# Patient Record
Sex: FEMALE | Race: WHITE | NOT HISPANIC OR LATINO | Employment: OTHER | URBAN - METROPOLITAN AREA
[De-identification: names, ages, dates, MRNs, and addresses within clinical notes are randomized per-mention and may not be internally consistent; named-entity substitution may affect disease eponyms.]

---

## 2023-08-14 ENCOUNTER — HOSPITAL ENCOUNTER (OUTPATIENT)
Dept: DATA CONVERSION | Facility: HOSPITAL | Age: 78
Discharge: HOME | End: 2023-08-14

## 2023-08-14 DIAGNOSIS — E78.00 PURE HYPERCHOLESTEROLEMIA, UNSPECIFIED: ICD-10-CM

## 2023-08-14 DIAGNOSIS — Z00.00 ENCOUNTER FOR GENERAL ADULT MEDICAL EXAMINATION WITHOUT ABNORMAL FINDINGS: ICD-10-CM

## 2023-08-14 LAB
ALBUMIN SERPL-MCNC: 4.5 GM/DL (ref 3.5–5)
ALBUMIN/GLOB SERPL: 2 RATIO (ref 1.5–3)
ALP BLD-CCNC: 52 U/L (ref 35–125)
ALT SERPL-CCNC: 25 U/L (ref 5–40)
ANION GAP SERPL CALCULATED.3IONS-SCNC: 9 MMOL/L (ref 0–19)
APPEARANCE PLAS: ABNORMAL
AST SERPL-CCNC: 19 U/L (ref 5–40)
BASOPHILS # BLD AUTO: 0.03 K/UL (ref 0–0.22)
BASOPHILS NFR BLD AUTO: 0.5 % (ref 0–1)
BILIRUB SERPL-MCNC: 0.4 MG/DL (ref 0.1–1.2)
BILIRUB UR QL STRIP.AUTO: NEGATIVE
BUN SERPL-MCNC: 28 MG/DL (ref 8–25)
BUN/CREAT SERPL: 25.5 RATIO (ref 8–21)
CALCIUM SERPL-MCNC: 10.5 MG/DL (ref 8.5–10.4)
CHLORIDE SERPL-SCNC: 107 MMOL/L (ref 97–107)
CHOLEST SERPL-MCNC: 209 MG/DL (ref 133–200)
CHOLEST/HDLC SERPL: 3.5 RATIO
CLARITY UR: CLEAR
CO2 SERPL-SCNC: 28 MMOL/L (ref 24–31)
COLOR SPUN FLD: ABNORMAL
COLOR UR: NORMAL
CREAT SERPL-MCNC: 1.1 MG/DL (ref 0.4–1.6)
DEPRECATED RDW RBC AUTO: 46.4 FL (ref 37–54)
DIFFERENTIAL METHOD BLD: ABNORMAL
EOSINOPHIL # BLD AUTO: 0.07 K/UL (ref 0–0.45)
EOSINOPHIL NFR BLD: 1.2 % (ref 0–3)
ERYTHROCYTE [DISTWIDTH] IN BLOOD BY AUTOMATED COUNT: 13.4 % (ref 11.7–15)
FASTING STATUS PATIENT QL REPORTED: ABNORMAL
GFR SERPL CREATININE-BSD FRML MDRD: 51 ML/MIN/1.73 M2
GLOBULIN SER-MCNC: 2.3 G/DL (ref 1.9–3.7)
GLUCOSE SERPL-MCNC: 94 MG/DL (ref 65–99)
GLUCOSE UR STRIP.AUTO-MCNC: NEGATIVE MG/DL
HCT VFR BLD AUTO: 47.8 % (ref 36–44)
HDLC SERPL-MCNC: 60 MG/DL
HGB BLD-MCNC: 15.3 GM/DL (ref 12–15)
HGB UR QL: NEGATIVE
IMM GRANULOCYTES # BLD AUTO: 0.04 K/UL (ref 0–0.1)
KETONES UR QL STRIP.AUTO: NEGATIVE
LDLC SERPL CALC-MCNC: 127 MG/DL (ref 65–130)
LEUKOCYTE ESTERASE UR QL STRIP.AUTO: NEGATIVE
LYMPHOCYTES # BLD AUTO: 1.56 K/UL (ref 1.2–3.2)
LYMPHOCYTES NFR BLD MANUAL: 26.4 % (ref 20–40)
MCH RBC QN AUTO: 30.2 PG (ref 26–34)
MCHC RBC AUTO-ENTMCNC: 32 % (ref 31–37)
MCV RBC AUTO: 94.3 FL (ref 80–100)
MONOCYTES # BLD AUTO: 0.53 K/UL (ref 0–0.8)
MONOCYTES NFR BLD MANUAL: 9 % (ref 0–8)
NEUTROPHILS # BLD AUTO: 3.69 K/UL
NEUTROPHILS # BLD AUTO: 3.69 K/UL (ref 1.8–7.7)
NEUTROPHILS.IMMATURE NFR BLD: 0.7 % (ref 0–1)
NEUTS SEG NFR BLD: 62.2 % (ref 50–70)
NITRITE UR QL STRIP.AUTO: NEGATIVE
NRBC BLD-RTO: 0 /100 WBC
PH UR STRIP.AUTO: 6 [PH] (ref 4.6–8)
PLATELET # BLD AUTO: 265 K/UL (ref 150–450)
PMV BLD AUTO: 9.9 CU (ref 7–12.6)
POTASSIUM SERPL-SCNC: 4.8 MMOL/L (ref 3.4–5.1)
PROT SERPL-MCNC: 6.8 G/DL (ref 5.9–7.9)
PROT UR STRIP.AUTO-MCNC: NEGATIVE MG/DL
RBC # BLD AUTO: 5.07 M/UL (ref 4–4.9)
REFLEX MICROSCOPIC (UA): NORMAL
SODIUM SERPL-SCNC: 144 MMOL/L (ref 133–145)
SP GR UR STRIP.AUTO: 1.02 (ref 1–1.03)
TRIGL SERPL-MCNC: 110 MG/DL (ref 40–150)
UROBILINOGEN UR QL STRIP.AUTO: NORMAL MG/DL (ref 0–1)
WBC # BLD AUTO: 5.9 K/UL (ref 4.5–11)

## 2023-09-23 ENCOUNTER — HOSPITAL ENCOUNTER (OUTPATIENT)
Dept: DATA CONVERSION | Facility: HOSPITAL | Age: 78
Discharge: HOME | End: 2023-09-23

## 2023-09-23 DIAGNOSIS — Z12.31 ENCOUNTER FOR SCREENING MAMMOGRAM FOR MALIGNANT NEOPLASM OF BREAST: ICD-10-CM

## 2023-11-20 ENCOUNTER — TELEPHONE (OUTPATIENT)
Dept: PRIMARY CARE | Facility: CLINIC | Age: 78
End: 2023-11-20

## 2023-11-20 ENCOUNTER — TELEMEDICINE (OUTPATIENT)
Dept: PRIMARY CARE | Facility: CLINIC | Age: 78
End: 2023-11-20
Payer: MEDICARE

## 2023-11-20 VITALS — HEART RATE: 86 BPM | OXYGEN SATURATION: 97 %

## 2023-11-20 DIAGNOSIS — U07.1 COVID-19 VIRUS INFECTION: Primary | ICD-10-CM

## 2023-11-20 PROCEDURE — 99441 PR PHYS/QHP TELEPHONE EVALUATION 5-10 MIN: CPT | Performed by: FAMILY MEDICINE

## 2023-11-20 RX ORDER — AMOXICILLIN 500 MG/1
CAPSULE ORAL
COMMUNITY
Start: 2023-11-16

## 2023-11-20 RX ORDER — CYCLOSPORINE 0.5 MG/ML
EMULSION OPHTHALMIC
COMMUNITY
Start: 2023-09-21 | End: 2023-11-20 | Stop reason: ALTCHOICE

## 2023-11-20 RX ORDER — LOVASTATIN 20 MG/1
20 TABLET ORAL DAILY
COMMUNITY

## 2023-11-20 ASSESSMENT — PATIENT HEALTH QUESTIONNAIRE - PHQ9
SUM OF ALL RESPONSES TO PHQ9 QUESTIONS 1 AND 2: 0
2. FEELING DOWN, DEPRESSED OR HOPELESS: NOT AT ALL
1. LITTLE INTEREST OR PLEASURE IN DOING THINGS: NOT AT ALL

## 2023-11-20 ASSESSMENT — PAIN SCALES - GENERAL: PAINLEVEL: 7

## 2023-11-20 ASSESSMENT — ENCOUNTER SYMPTOMS
HEADACHES: 1
COUGH: 1
SORE THROAT: 1

## 2023-11-20 NOTE — TELEPHONE ENCOUNTER
Patient tested positive to COVID yesterday and is asking for a medication to be called into the pharmacy.  She is having cough, congestion, severe headache, S/T and fatigue    Pharmacy: [facility]

## 2023-11-20 NOTE — PROGRESS NOTES
Subjective   Patient ID: Carmita Araiza is a 78 y.o. female who presents for COVID (Positive home test 11/19/Symptoms started 3 days ago ), Headache, Facial Pain, Sore Throat, and Cough.    The patient has a COVID-19 infection which began 3 days ago.  She tested positive for COVID-19 yesterday.  She admits to headache, head congestion, nasal congestion, throat pain, nonproductive cough, intermittent body aches, only able to taste salt, loss of smell, and chills.  She denies SOB, wheezing, fever, and otalgia.  She has taken Advil and Tylenol for the symptoms.  She has been taking lozenges for her throat.  Her  is currently getting over a COVID-19 infection.    We were unable to connect via video as the patient only has a landline.  The phone call lasted 10 minutes.      Review of Systems   HENT:  Positive for sore throat.    Respiratory:  Positive for cough.    Neurological:  Positive for headaches.     Objective   Pulse 86   SpO2 97%     Physical Exam  Constitutional:       Comments: Nasal phonation.   Pulmonary:      Comments: Nonlabored breathing.  Neurological:      Mental Status: She is alert.     Assessment/Plan   Problem List Items Addressed This Visit    None  Visit Diagnoses         Codes    COVID-19 virus infection    -  Primary U07.1        Onset 11/17 (day 0).  Positive home COVID-19 test.  Isolate days 1-5.  Mask in public days 6-10 as long as afebrile and feeling better.    Discussed Paxlovid, patient declined.    OTC medications as needed.   Adequate hydration.  Get plenty of rest.  Follow up if symptoms worsen.

## 2024-02-19 ENCOUNTER — TELEPHONE (OUTPATIENT)
Dept: PRIMARY CARE | Facility: CLINIC | Age: 79
End: 2024-02-19
Payer: MEDICARE

## 2024-02-19 DIAGNOSIS — N18.30 STAGE 3 CHRONIC KIDNEY DISEASE, UNSPECIFIED WHETHER STAGE 3A OR 3B CKD (MULTI): ICD-10-CM

## 2024-02-19 DIAGNOSIS — Z00.00 ROUTINE GENERAL MEDICAL EXAMINATION AT A HEALTH CARE FACILITY: ICD-10-CM

## 2024-02-19 DIAGNOSIS — E78.00 PURE HYPERCHOLESTEROLEMIA: ICD-10-CM

## 2024-02-19 DIAGNOSIS — E83.52 HYPERCALCEMIA: Primary | ICD-10-CM

## 2024-02-19 PROBLEM — R73.01 IMPAIRED FASTING GLUCOSE: Status: ACTIVE | Noted: 2018-10-10

## 2024-06-07 ENCOUNTER — OFFICE VISIT (OUTPATIENT)
Dept: DERMATOLOGY | Facility: CLINIC | Age: 79
End: 2024-06-07
Payer: MEDICARE

## 2024-06-07 DIAGNOSIS — L82.0 INFLAMED SEBORRHEIC KERATOSIS: ICD-10-CM

## 2024-06-07 DIAGNOSIS — D18.01 ANGIOMA OF SKIN: ICD-10-CM

## 2024-06-07 DIAGNOSIS — L90.5 SCAR CONDITIONS AND FIBROSIS OF SKIN: ICD-10-CM

## 2024-06-07 DIAGNOSIS — L81.4 LENTIGO: ICD-10-CM

## 2024-06-07 DIAGNOSIS — D22.9 BENIGN NEVUS: ICD-10-CM

## 2024-06-07 DIAGNOSIS — D48.5 NEOPLASM OF UNCERTAIN BEHAVIOR OF SKIN: Primary | ICD-10-CM

## 2024-06-07 DIAGNOSIS — L82.1 SEBORRHEIC KERATOSIS: ICD-10-CM

## 2024-06-07 DIAGNOSIS — L57.9 SKIN CHANGES DUE TO CHRONIC EXPOSURE TO NONIONIZING RADIATION: ICD-10-CM

## 2024-06-07 DIAGNOSIS — L64.9 ANDROGENETIC ALOPECIA: ICD-10-CM

## 2024-06-07 PROCEDURE — 17110 DESTRUCTION B9 LES UP TO 14: CPT | Performed by: NURSE PRACTITIONER

## 2024-06-07 PROCEDURE — 99203 OFFICE O/P NEW LOW 30 MIN: CPT | Performed by: NURSE PRACTITIONER

## 2024-06-07 PROCEDURE — 1160F RVW MEDS BY RX/DR IN RCRD: CPT | Performed by: NURSE PRACTITIONER

## 2024-06-07 PROCEDURE — 1036F TOBACCO NON-USER: CPT | Performed by: NURSE PRACTITIONER

## 2024-06-07 PROCEDURE — 1159F MED LIST DOCD IN RCRD: CPT | Performed by: NURSE PRACTITIONER

## 2024-06-07 PROCEDURE — 11102 TANGNTL BX SKIN SINGLE LES: CPT | Performed by: NURSE PRACTITIONER

## 2024-06-07 RX ORDER — MINOXIDIL 2.5 MG/1
TABLET ORAL
Qty: 15 TABLET | Refills: 2 | Status: SHIPPED | OUTPATIENT
Start: 2024-06-07

## 2024-06-07 NOTE — PATIENT INSTRUCTIONS

## 2024-06-07 NOTE — PROGRESS NOTES
Subjective     Carmita Araiza is a 79 y.o. female who presents for the following: Skin Check.     Right lower back lesion is very itchy and irritated.   Review of Systems:  No other skin or systemic complaints other than what is documented elsewhere in the note.    The following portions of the chart were reviewed this encounter and updated as appropriate:   Tobacco  Allergies  Meds  Problems  Med Hx  Surg Hx         Skin Cancer History  No skin cancer on file.      Specialty Problems    None       Objective   Well appearing patient in no apparent distress; mood and affect are within normal limits.    A full examination was performed including scalp, head, eyes, ears, nose, lips, neck, chest, axillae, abdomen, back, buttocks, bilateral upper extremities, bilateral lower extremities, hands, feet, fingers, toes, fingernails, and toenails. All findings within normal limits unless otherwise noted below.      Assessment/Plan   1. Neoplasm of uncertain behavior of skin  Left Upper Arm - Anterior  5.5 x 7.5 mm feature poor tan macule              Lesion biopsy  Type of biopsy: tangential    Informed consent: discussed and consent obtained    Timeout: patient name, date of birth, surgical site, and procedure verified    Procedure prep:  Patient was prepped and draped  Anesthesia: the lesion was anesthetized in a standard fashion    Anesthetic:  1% lidocaine plain local infiltration  Instrument used: DermaBlade    Hemostasis achieved with: electrodesiccation    Outcome: patient tolerated procedure well    Post-procedure details: wound care instructions given    Additional details:  Cleaned area with isopropyl alcohol prior to anesthesia or biopsy. Applied thin layer of vaseline and covered with bandaid after procedure      Staff Communication: Dermatology Local Anesthesia: 1 % Plain Lidocaine - Amount: 0.5 ml    Specimen 1 - Dermatopathology- DERM LAB  Differential Diagnosis: MM vs DN vs lentigo  Check Margins  Yes/No?:    Comments:    Dermpath Lab: Routine Histopathology (formalin-fixed tissue)    NUB  - Given uncertainty in clinical diagnosis, shave biopsy is recommended in clinic today.  - The patient expressed understanding, is in agreement with this plan, and wishes to proceed with biopsy.  - Oral and written wound care instructions provided.  - Advised patient that the office will call within 2 weeks to discuss biopsy results.      2. Androgenetic alopecia  Scalp  Diffuse thinning of the crown and widening of the midline part with retention of the frontal hairline.    -Discussed the mechanism of action of androgenetic alopecia, including the slowly progressive nature of the condition and the need for any therapy to be continued long term to maintain results.  -Discussed available therapies and anticipated efficacy, including topical and/or oral and/or injectable treatments.   -Recommend oral minoxidil. Potential side effects included increased growth of facial hair, lightheadedness, dizziness, lowering systolic blood pressure ~5 points. Usually tolerated very well as it is a very low dose of minoxidil. May take 4-6 months to determine benefit.    /80. Photos taken with patient's phone for reference.       Related Medications  minoxidil (Loniten) 2.5 mg tablet  Take one half tablet (1.25mg) by mouth once daily    3. Inflamed seborrheic keratosis  Right Lower Back  Erythematous excoriated crusty verrucal papule(s) and/or plaque(s)    Inflamed Seborrheic Keratosis  - Benign nature of lesion(s) discussed with patient, and reassurance provided.  - Given the lesion (s) are very itchy or irritated, the patient is seeking removal and treatment with liquid nitrogen cryotherapy in clinic today was recommended.  - The patient expressed understanding, is in agreement with this plan, and wishes to proceed with liquid nitrogen cryotherapy as documented above.    Destr of lesion - Right Lower Back  Complexity: simple     Destruction method: cryotherapy    Timeout:  patient name, date of birth, surgical site, and procedure verified  Lesion destroyed using liquid nitrogen: Yes    Region frozen until ice ball extended beyond lesion: Yes    Cryotherapy cycles:  3  Outcome: patient tolerated procedure well with no complications      4. Angioma of skin  Scattered cherry-red papule(s).    A cherry hemangioma is a small macule (small, flat, smooth area) or papule (small, solid bump) formed from an overgrowth of tiny blood vessels in the skin. Cherry hemangiomas are characteristically red or purplish in color. They often first appear in middle adulthood and usually increase in number with age. Cherry hemangiomas are noncancerous (benign) and are common in adults.    The present appearance of the lesion is not worrisome but it should continue to be observed and testing/treatment may be warranted if change occurs.    5. Benign nevus  Scattered, uniform and benign-appearing, regular brown melanocytic papules and macules.    Left mid lateral thigh area has a 3 x 2mm blue papule - stable compared to [facility] notes.     The present appearance of the lesion is not worrisome but it should continue to be observed and testing/treatment may be warranted if change occurs.    6. Seborrheic keratosis  Stuck on verrucous, tan-brown papules and plaques.      Seborrheic keratoses are common noncancerous (benign) growths of unknown cause seen in adults due to a thickening of an area of the top skin layer. Seborrheic keratoses may appear as if they are stuck on to the skin. They have distinct borders, and they may appear as papules (small, solid bumps) or plaques (solid, raised patches that are bigger than a thumbnail). They may be the same color as your skin, or they may be pink, light brown, darker brown, or very dark brown, or sometimes may appear black.    There is no way to prevent new seborrheic keratoses from forming. Seborrheic keratoses can be  removed, but removal is considered a cosmetic issue and is usually not covered by insurance.    PLAN  No treatment is needed unless there is irritation from clothing, such as itching or bleeding.  2.   Some lotions containing alpha hydroxy acids, salicylic acid, or urea may make the areas feel smoother with regular use but will not eliminate them.    7. Lentigo  Scattered tan macules in sun-exposed areas.    A solar lentigo (plural, solar lentigines), also known as a sun-induced freckle or senile lentigo, is a dark (hyperpigmented) lesion caused by natural or artificial ultraviolet (UV) light. Solar lentigines may be single or multiple. This type of lentigo is different from a simple lentigo (lentigo simplex) because it is caused by exposure to UV light. Solar lentigines are benign, but they do indicate excessive sun exposure, a risk factor for the development of skin cancer.    To prevent solar lentigines, avoid exposure to sunlight in midday (10 AM to 3 PM), wear sun-protective clothing (tightly woven clothes and hats), and apply sunscreen (SPF 30 UVA and UVB block).    The present appearance of the lesion is not worrisome but it should continue to be observed and testing/treatment may be warranted if change occurs.    8. Scar conditions and fibrosis of skin  Well healed scar at the site(s) of prior treatment with no evidence of recurrence.          The scar is clear, there is no evidence of recurrence.  The present appearance of the scar is not worrisome but it should continue to be observed and testing/treatment may be warranted if change occurs.      9. Skin changes due to chronic exposure to nonionizing radiation  Actinic changes in the form of freckles, lentigines and hyper/hypopigmentation     ABCDEs of melanoma and atypical moles were discussed with the patient.    Patient was instructed to perform monthly self skin examination.  We recommended that the patient have regular full skin exams given an  increased risk of subsequent skin cancers.    The patient was instructed to use sun protective behaviors including use of broad spectrum sunscreens and sun protective clothing to reduce risk of skin cancers.    Warning signs of non-melanoma skin cancer discussed.        Return to clinic to be determined. Will call with results

## 2024-06-13 LAB
LAB AP ASR DISCLAIMER: NORMAL
LABORATORY COMMENT REPORT: NORMAL
PATH REPORT.FINAL DX SPEC: NORMAL
PATH REPORT.GROSS SPEC: NORMAL
PATH REPORT.MICROSCOPIC SPEC OTHER STN: NORMAL
PATH REPORT.RELEVANT HX SPEC: NORMAL
PATH REPORT.TOTAL CANCER: NORMAL

## 2024-06-14 ENCOUNTER — TELEPHONE (OUTPATIENT)
Dept: DERMATOLOGY | Facility: CLINIC | Age: 79
End: 2024-06-14
Payer: MEDICARE

## 2024-06-14 NOTE — TELEPHONE ENCOUNTER
Mixture of a benign mole and sun spot. No further treatment required. Return to clinic in 1 year for routine skin check.

## 2024-08-02 ENCOUNTER — TELEPHONE (OUTPATIENT)
Dept: DERMATOLOGY | Facility: CLINIC | Age: 79
End: 2024-08-02
Payer: MEDICARE

## 2024-08-02 NOTE — TELEPHONE ENCOUNTER
Patient would like to stop Minoxidil, she is having what she thinks are side affects. Burning stomach, hoarseness, sores on the corners of her mouth and she is tired.

## 2024-08-02 NOTE — TELEPHONE ENCOUNTER
That is fine to stop. Those side effects for low dose minoxidil have not been reported in the literature so the symptoms she is having may be related to another disease process. I recommend following up with her PCP or if symptoms are severe going to the ER.

## 2024-08-26 ENCOUNTER — APPOINTMENT (OUTPATIENT)
Dept: PRIMARY CARE | Facility: CLINIC | Age: 79
End: 2024-08-26
Payer: MEDICARE

## 2024-08-28 PROBLEM — E83.52 HYPERCALCEMIA: Status: ACTIVE | Noted: 2023-08-25

## 2024-08-28 PROBLEM — M81.0 OSTEOPOROSIS: Status: ACTIVE | Noted: 2018-10-10

## 2024-08-28 PROBLEM — L29.8 PRURITIC ERYTHEMATOUS RASH: Status: RESOLVED | Noted: 2022-10-17 | Resolved: 2024-08-28

## 2024-08-28 PROBLEM — L29.89 PRURITIC ERYTHEMATOUS RASH: Status: RESOLVED | Noted: 2022-10-17 | Resolved: 2024-08-28

## 2024-08-28 PROBLEM — R10.13 EPIGASTRIC PAIN: Status: RESOLVED | Noted: 2023-06-22 | Resolved: 2024-08-28

## 2024-08-28 PROBLEM — R79.89 ABNORMAL TSH: Status: ACTIVE | Noted: 2018-10-10

## 2024-08-28 PROBLEM — N18.30 CHRONIC KIDNEY DISEASE, STAGE 3, MOD DECREASED GFR (MULTI): Status: ACTIVE | Noted: 2023-08-25

## 2024-08-28 PROBLEM — R25.1 SHAKINESS: Status: ACTIVE | Noted: 2023-06-22

## 2024-08-28 PROBLEM — R10.10 PAIN OF UPPER ABDOMEN: Status: RESOLVED | Noted: 2023-06-22 | Resolved: 2024-08-28

## 2024-08-28 PROBLEM — R45.1 RESTLESSNESS: Status: ACTIVE | Noted: 2023-06-22

## 2024-09-09 ENCOUNTER — LAB (OUTPATIENT)
Dept: LAB | Facility: LAB | Age: 79
End: 2024-09-09
Payer: MEDICARE

## 2024-09-09 DIAGNOSIS — N18.30 STAGE 3 CHRONIC KIDNEY DISEASE, UNSPECIFIED WHETHER STAGE 3A OR 3B CKD (MULTI): ICD-10-CM

## 2024-09-09 DIAGNOSIS — E78.00 PURE HYPERCHOLESTEROLEMIA: ICD-10-CM

## 2024-09-09 DIAGNOSIS — E83.52 HYPERCALCEMIA: ICD-10-CM

## 2024-09-09 DIAGNOSIS — Z00.00 ROUTINE GENERAL MEDICAL EXAMINATION AT A HEALTH CARE FACILITY: ICD-10-CM

## 2024-09-09 LAB
ALBUMIN SERPL-MCNC: 4.9 G/DL (ref 3.5–5)
ALP BLD-CCNC: 60 U/L (ref 35–125)
ALT SERPL-CCNC: 18 U/L (ref 5–40)
ANION GAP SERPL CALC-SCNC: 14 MMOL/L
APPEARANCE UR: ABNORMAL
AST SERPL-CCNC: 22 U/L (ref 5–40)
BASOPHILS # BLD AUTO: 0.03 X10*3/UL (ref 0–0.1)
BASOPHILS NFR BLD AUTO: 0.4 %
BILIRUB SERPL-MCNC: 0.5 MG/DL (ref 0.1–1.2)
BILIRUB UR STRIP.AUTO-MCNC: NEGATIVE MG/DL
BUN SERPL-MCNC: 23 MG/DL (ref 8–25)
CALCIUM SERPL-MCNC: 10.6 MG/DL (ref 8.5–10.4)
CHLORIDE SERPL-SCNC: 102 MMOL/L (ref 97–107)
CHOLEST SERPL-MCNC: 237 MG/DL (ref 133–200)
CHOLEST/HDLC SERPL: 3.6 {RATIO}
CO2 SERPL-SCNC: 24 MMOL/L (ref 24–31)
COLOR UR: ABNORMAL
CREAT SERPL-MCNC: 0.9 MG/DL (ref 0.4–1.6)
EGFRCR SERPLBLD CKD-EPI 2021: 65 ML/MIN/1.73M*2
EOSINOPHIL # BLD AUTO: 0.08 X10*3/UL (ref 0–0.4)
EOSINOPHIL NFR BLD AUTO: 1.2 %
ERYTHROCYTE [DISTWIDTH] IN BLOOD BY AUTOMATED COUNT: 13.8 % (ref 11.5–14.5)
EST. AVERAGE GLUCOSE BLD GHB EST-MCNC: 105 MG/DL
GLUCOSE SERPL-MCNC: 97 MG/DL (ref 65–99)
GLUCOSE UR STRIP.AUTO-MCNC: NORMAL MG/DL
HBA1C MFR BLD: 5.3 %
HCT VFR BLD AUTO: 49.4 % (ref 36–46)
HDLC SERPL-MCNC: 66 MG/DL
HGB BLD-MCNC: 16 G/DL (ref 12–16)
IMM GRANULOCYTES # BLD AUTO: 0.04 X10*3/UL (ref 0–0.5)
IMM GRANULOCYTES NFR BLD AUTO: 0.6 % (ref 0–0.9)
KETONES UR STRIP.AUTO-MCNC: NEGATIVE MG/DL
LDLC SERPL CALC-MCNC: 141 MG/DL (ref 65–130)
LEUKOCYTE ESTERASE UR QL STRIP.AUTO: ABNORMAL
LYMPHOCYTES # BLD AUTO: 2.13 X10*3/UL (ref 0.8–3)
LYMPHOCYTES NFR BLD AUTO: 31.5 %
MCH RBC QN AUTO: 30.6 PG (ref 26–34)
MCHC RBC AUTO-ENTMCNC: 32.4 G/DL (ref 32–36)
MCV RBC AUTO: 95 FL (ref 80–100)
MONOCYTES # BLD AUTO: 0.56 X10*3/UL (ref 0.05–0.8)
MONOCYTES NFR BLD AUTO: 8.3 %
MUCOUS THREADS #/AREA URNS AUTO: ABNORMAL /LPF
NEUTROPHILS # BLD AUTO: 3.93 X10*3/UL (ref 1.6–5.5)
NEUTROPHILS NFR BLD AUTO: 58 %
NITRITE UR QL STRIP.AUTO: NEGATIVE
NRBC BLD-RTO: 0 /100 WBCS (ref 0–0)
PH UR STRIP.AUTO: 6.5 [PH]
PLATELET # BLD AUTO: 261 X10*3/UL (ref 150–450)
POTASSIUM SERPL-SCNC: 4.4 MMOL/L (ref 3.4–5.1)
PROT SERPL-MCNC: 7.1 G/DL (ref 5.9–7.9)
PROT UR STRIP.AUTO-MCNC: NEGATIVE MG/DL
RBC # BLD AUTO: 5.23 X10*6/UL (ref 4–5.2)
RBC # UR STRIP.AUTO: NEGATIVE /UL
RBC #/AREA URNS AUTO: ABNORMAL /HPF
SODIUM SERPL-SCNC: 140 MMOL/L (ref 133–145)
SP GR UR STRIP.AUTO: 1.01
SQUAMOUS #/AREA URNS AUTO: ABNORMAL /HPF
TRIGL SERPL-MCNC: 152 MG/DL (ref 40–150)
UROBILINOGEN UR STRIP.AUTO-MCNC: NORMAL MG/DL
WBC # BLD AUTO: 6.8 X10*3/UL (ref 4.4–11.3)
WBC #/AREA URNS AUTO: ABNORMAL /HPF
YEAST BUDDING #/AREA UR COMP ASSIST: PRESENT /HPF

## 2024-09-09 PROCEDURE — 36415 COLL VENOUS BLD VENIPUNCTURE: CPT

## 2024-09-16 ENCOUNTER — APPOINTMENT (OUTPATIENT)
Dept: PRIMARY CARE | Facility: CLINIC | Age: 79
End: 2024-09-16
Payer: MEDICARE

## 2024-09-16 ENCOUNTER — TELEPHONE (OUTPATIENT)
Dept: PRIMARY CARE | Facility: CLINIC | Age: 79
End: 2024-09-16

## 2024-09-16 VITALS
HEART RATE: 85 BPM | WEIGHT: 129 LBS | OXYGEN SATURATION: 97 % | DIASTOLIC BLOOD PRESSURE: 70 MMHG | BODY MASS INDEX: 23.74 KG/M2 | TEMPERATURE: 97.9 F | SYSTOLIC BLOOD PRESSURE: 118 MMHG | HEIGHT: 62 IN

## 2024-09-16 DIAGNOSIS — N18.2 CKD (CHRONIC KIDNEY DISEASE) STAGE 2, GFR 60-89 ML/MIN: ICD-10-CM

## 2024-09-16 DIAGNOSIS — E78.00 HYPERCHOLESTEREMIA: ICD-10-CM

## 2024-09-16 DIAGNOSIS — E83.52 HYPERCALCEMIA: ICD-10-CM

## 2024-09-16 DIAGNOSIS — E83.52 HYPERCALCEMIA: Primary | ICD-10-CM

## 2024-09-16 DIAGNOSIS — R82.90 ABNORMAL URINALYSIS: ICD-10-CM

## 2024-09-16 DIAGNOSIS — G47.62 NOCTURNAL LEG CRAMPS: ICD-10-CM

## 2024-09-16 DIAGNOSIS — Z00.00 ROUTINE GENERAL MEDICAL EXAMINATION AT HEALTH CARE FACILITY: Primary | ICD-10-CM

## 2024-09-16 DIAGNOSIS — Z12.31 ENCOUNTER FOR SCREENING MAMMOGRAM FOR MALIGNANT NEOPLASM OF BREAST: ICD-10-CM

## 2024-09-16 DIAGNOSIS — Z23 NEED FOR INFLUENZA VACCINATION: ICD-10-CM

## 2024-09-16 PROBLEM — R10.13 EPIGASTRIC PAIN: Status: RESOLVED | Noted: 2023-06-22 | Resolved: 2024-09-16

## 2024-09-16 PROBLEM — L28.2 PRURITIC RASH: Status: RESOLVED | Noted: 2022-10-17 | Resolved: 2024-09-16

## 2024-09-16 PROBLEM — M85.80 OSTEOPENIA: Status: ACTIVE | Noted: 2018-10-10

## 2024-09-16 PROBLEM — R73.01 IMPAIRED FASTING GLUCOSE: Status: RESOLVED | Noted: 2018-10-10 | Resolved: 2024-09-16

## 2024-09-16 PROBLEM — R79.89 ABNORMAL TSH: Status: RESOLVED | Noted: 2018-10-10 | Resolved: 2024-09-16

## 2024-09-16 PROBLEM — N18.30 STAGE 3 CHRONIC KIDNEY DISEASE (MULTI): Status: RESOLVED | Noted: 2023-08-25 | Resolved: 2024-09-16

## 2024-09-16 PROBLEM — R45.1 RESTLESSNESS AND AGITATION: Status: RESOLVED | Noted: 2023-06-22 | Resolved: 2024-09-16

## 2024-09-16 PROBLEM — R25.1 TREMOR: Status: RESOLVED | Noted: 2023-06-22 | Resolved: 2024-09-16

## 2024-09-16 PROCEDURE — 1123F ACP DISCUSS/DSCN MKR DOCD: CPT | Performed by: FAMILY MEDICINE

## 2024-09-16 PROCEDURE — 99397 PER PM REEVAL EST PAT 65+ YR: CPT | Performed by: FAMILY MEDICINE

## 2024-09-16 PROCEDURE — 99214 OFFICE O/P EST MOD 30 MIN: CPT | Performed by: FAMILY MEDICINE

## 2024-09-16 PROCEDURE — 1160F RVW MEDS BY RX/DR IN RCRD: CPT | Performed by: FAMILY MEDICINE

## 2024-09-16 PROCEDURE — 1036F TOBACCO NON-USER: CPT | Performed by: FAMILY MEDICINE

## 2024-09-16 PROCEDURE — 90662 IIV NO PRSV INCREASED AG IM: CPT | Performed by: FAMILY MEDICINE

## 2024-09-16 PROCEDURE — G0439 PPPS, SUBSEQ VISIT: HCPCS | Performed by: FAMILY MEDICINE

## 2024-09-16 PROCEDURE — 1159F MED LIST DOCD IN RCRD: CPT | Performed by: FAMILY MEDICINE

## 2024-09-16 PROCEDURE — 1170F FXNL STATUS ASSESSED: CPT | Performed by: FAMILY MEDICINE

## 2024-09-16 PROCEDURE — G0008 ADMIN INFLUENZA VIRUS VAC: HCPCS | Performed by: FAMILY MEDICINE

## 2024-09-16 RX ORDER — LOVASTATIN 20 MG/1
20 TABLET ORAL DAILY
Qty: 90 TABLET | Refills: 3 | Status: SHIPPED | OUTPATIENT
Start: 2024-09-16 | End: 2025-09-16

## 2024-09-16 ASSESSMENT — ACTIVITIES OF DAILY LIVING (ADL)
DOING_HOUSEWORK: INDEPENDENT
DRESSING: INDEPENDENT
GROCERY_SHOPPING: INDEPENDENT
MANAGING_FINANCES: INDEPENDENT
TAKING_MEDICATION: INDEPENDENT
BATHING: INDEPENDENT

## 2024-09-16 ASSESSMENT — ENCOUNTER SYMPTOMS
GASTROINTESTINAL NEGATIVE: 1
PSYCHIATRIC NEGATIVE: 1
CARDIOVASCULAR NEGATIVE: 1
NEUROLOGICAL NEGATIVE: 1
HEMATOLOGIC/LYMPHATIC NEGATIVE: 1
RESPIRATORY NEGATIVE: 1
ALLERGIC/IMMUNOLOGIC NEGATIVE: 1
EYES NEGATIVE: 1
CONSTITUTIONAL NEGATIVE: 1
MUSCULOSKELETAL NEGATIVE: 1

## 2024-09-16 ASSESSMENT — COLUMBIA-SUICIDE SEVERITY RATING SCALE - C-SSRS
2. HAVE YOU ACTUALLY HAD ANY THOUGHTS OF KILLING YOURSELF?: NO
1. IN THE PAST MONTH, HAVE YOU WISHED YOU WERE DEAD OR WISHED YOU COULD GO TO SLEEP AND NOT WAKE UP?: NO
6. HAVE YOU EVER DONE ANYTHING, STARTED TO DO ANYTHING, OR PREPARED TO DO ANYTHING TO END YOUR LIFE?: NO

## 2024-09-16 ASSESSMENT — PATIENT HEALTH QUESTIONNAIRE - PHQ9
2. FEELING DOWN, DEPRESSED OR HOPELESS: NOT AT ALL
SUM OF ALL RESPONSES TO PHQ9 QUESTIONS 1 AND 2: 0
1. LITTLE INTEREST OR PLEASURE IN DOING THINGS: NOT AT ALL

## 2024-09-16 NOTE — ASSESSMENT & PLAN NOTE
Improved.  GFR 65.  Continue to refrain from taking NSAIDs.  Adequate hydration.  Follow up in 1 year.

## 2024-09-16 NOTE — PROGRESS NOTES
Subjective   Reason for Visit: Carmita Araiza is an 79 y.o. female here for a Medicare Wellness visit.      Reviewed all medications by prescribing practitioner or clinical pharmacist (such as prescriptions, OTCs, herbal therapies and supplements) and documented in the medical record.    Shingles, tetanus, pneumonia, and COVID-19 vaccinations are UTD.  She agrees to receive an influenza vaccination today.  She had normal Paps in the past.  She is due for a screening mammogram this year and agrees to have one completed.  The patient declines a DEXA scan.  She takes Nutraden bone support with magnesium.  She had a screening colonoscopy completed in 2019 and was told to repeat it in 5 years.  She will call Dr. Dick's office to make an appointment.  EKG is UTD.  Fasting labs were completed recently.   1) Hypercholesterolemia: uncontrolled, TC, LDL, and TGs abnormal, compliant with lovastatin, tries to follow a low fat diet, exercises 3 days a week (weights, bicycle, and stretchy bands).  2) Hypercalcemia: sable, calcium 10.6, will monitor.  3) CKD stage 2: improved from last year, eGFR 65, stopped taking OTC NSAIDs, takes Tylenol occasionally, just started drinking more fluids daily.   4) Abnormal UA with yeast present: denies symptoms, would like to hold off on treatment at this time.    5) Nocturnal leg cramps: chronic, worsening, associated with tingling of the lower extremities.      Patient Care Team:  Nya Melton DO as PCP - General (Family Medicine)  Nya Melton DO as PCP - [employer] PCP     Review of Systems   Constitutional: Negative.    HENT: Negative.     Eyes: Negative.    Respiratory: Negative.     Cardiovascular: Negative.    Gastrointestinal: Negative.    Genitourinary: Negative.    Musculoskeletal: Negative.    Skin: Negative.    Allergic/Immunologic: Negative.    Neurological: Negative.    Hematological: Negative.    Psychiatric/Behavioral: Negative.       Objective  "  Vitals:  /84 (BP Location: Left arm, Patient Position: Sitting, BP Cuff Size: Adult)   Pulse 85   Temp 36.6 °C (97.9 °F) (Temporal)   Ht 1.582 m (5' 2.3\")   Wt 58.5 kg (129 lb)   SpO2 97%   BMI 23.37 kg/m²       Physical Exam  Vitals and nursing note reviewed.   Constitutional:       Appearance: Normal appearance.   HENT:      Head: Normocephalic and atraumatic.      Right Ear: Tympanic membrane, ear canal and external ear normal.      Left Ear: Tympanic membrane, ear canal and external ear normal.      Nose: Nose normal.      Mouth/Throat:      Mouth: Mucous membranes are moist.      Pharynx: Oropharynx is clear.   Eyes:      Extraocular Movements: Extraocular movements intact.      Conjunctiva/sclera: Conjunctivae normal.      Pupils: Pupils are equal, round, and reactive to light.   Neck:      Vascular: No carotid bruit.      Comments: No thyromegaly  Cardiovascular:      Rate and Rhythm: Normal rate and regular rhythm.      Pulses: Normal pulses.      Heart sounds: Normal heart sounds.   Pulmonary:      Effort: Pulmonary effort is normal.      Breath sounds: Normal breath sounds.   Abdominal:      General: Bowel sounds are normal.      Palpations: Abdomen is soft.   Musculoskeletal:         General: Normal range of motion.      Cervical back: Normal range of motion and neck supple.      Comments: 5/5 muscle strength x 4 extremities   Skin:     General: Skin is warm and dry.      Capillary Refill: Capillary refill takes less than 2 seconds.   Neurological:      General: No focal deficit present.      Mental Status: She is alert and oriented to person, place, and time.   Psychiatric:         Mood and Affect: Mood normal.         Behavior: Behavior normal.       Assessment & Plan  Routine general medical examination at health care facility  Shingles, tetanus, pneumonia, and COVID-19 vaccinations UTD.  Patient declines DEXA.  Patient will schedule screening colonoscopy.  Await input.  EKG UTD.  Fasting " labs completed recently.  Repeat exam in 1 year.  Orders:    1 Year Follow Up In Primary Care - Wellness Exam; Future    Hypercholesteremia  Uncontrolled.  TC, LDL, and TGs abnormal.  Continue with lovastatin, refilled.  Low fat diet.  Regular exercise.  Manage weight.  Follow up in 1 year.   Orders:    lovastatin (Mevacor) 20 mg tablet; Take 1 tablet (20 mg) by mouth once daily.     Hypercalcemia  Stable.  Calcium 10.6.  Asymptomatic.  Follow up in 1 year.        CKD (chronic kidney disease) stage 2, GFR 60-89 ml/min  Improved.  GFR 65.  Continue to refrain from taking NSAIDs.  Adequate hydration.  Follow up in 1 year.        Abnormal urinalysis  New.  Yeast present.  Patient declines treatment.  Follow up as needed.       Nocturnal leg cramps  Chronic.  Worsening.  Recommended taking quinine daily.  Follow up if not better.         Need for influenza vaccination  Influenza vaccination given today.  Repeat in 1 year.   Orders:    Flu vaccine, trivalent, preservative free, HIGH-DOSE, age 65y+ (Fluzone)    Encounter for screening mammogram for malignant neoplasm of breast  Mammogram ordered.  Await results.  Follow up as directed.    Orders:    BI mammo bilateral screening tomosynthesis; Future

## 2024-09-16 NOTE — ASSESSMENT & PLAN NOTE
Uncontrolled.  TC, LDL, and TGs abnormal.  Continue with lovastatin, refilled.  Low fat diet.  Regular exercise.  Manage weight.  Follow up in 1 year.   Orders:    lovastatin (Mevacor) 20 mg tablet; Take 1 tablet (20 mg) by mouth once daily.

## 2024-09-30 ENCOUNTER — HOSPITAL ENCOUNTER (OUTPATIENT)
Dept: RADIOLOGY | Facility: CLINIC | Age: 79
Discharge: HOME | End: 2024-09-30
Payer: MEDICARE

## 2024-09-30 VITALS — BODY MASS INDEX: 23.55 KG/M2 | HEIGHT: 62 IN | WEIGHT: 128 LBS

## 2024-09-30 DIAGNOSIS — Z12.31 ENCOUNTER FOR SCREENING MAMMOGRAM FOR MALIGNANT NEOPLASM OF BREAST: ICD-10-CM

## 2024-09-30 PROCEDURE — 77063 BREAST TOMOSYNTHESIS BI: CPT | Performed by: STUDENT IN AN ORGANIZED HEALTH CARE EDUCATION/TRAINING PROGRAM

## 2024-09-30 PROCEDURE — 77067 SCR MAMMO BI INCL CAD: CPT

## 2024-09-30 PROCEDURE — 77067 SCR MAMMO BI INCL CAD: CPT | Performed by: STUDENT IN AN ORGANIZED HEALTH CARE EDUCATION/TRAINING PROGRAM

## 2024-10-07 ENCOUNTER — APPOINTMENT (OUTPATIENT)
Dept: DERMATOLOGY | Facility: CLINIC | Age: 79
End: 2024-10-07
Payer: MEDICARE

## 2025-01-14 NOTE — PROGRESS NOTES
Verbal consent of the patient and/or verbal parental consent for patients under the age of 18 have been obtained to conduct a physical examination at this office visit.    New patient  History Of Present Illness  01/16/25 Carmita Araiza is a 79 y.o. female who presents for an evaluation of their BILATERAL legs/low back pain.  She was graciously referred in by Dr. Ballard. States that she has been experiencing lower leg pain with tingling on the anterior portion of the legs right worse than left for approximately 6 months. States her pain is a consistent 4/10 pain right worse than left. She experiences restless leg syndrome and leg tremors right more than left and was being treated for that by Dr. Ballard. States that she uses Bengay and magnesium cream for her lower leg pain. 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 She has a history of foot problems from years of dancing in heels.  NSAIDs and Tylenol does not help.  She states that she has been experiencing headaches for 2 week, also noted tremors on right side more than left with vision issues.  She denies knee pain and back pain.  Additionally there is family history: parkinson's (father), frontal lobe dementia (mother).  She says the majority of her pain are in both of her shin areas and she points from mid to distal shins as well as into both of her feet.  She says her feet are additionally messed up with neuropathy because of how much dancing she has done.      All previous Progress Notes and imaging results related to this patients chief complaint have been reviewed in preparation for this examination.    Past Medical History  She has a past medical history of Epigastric pain (06/22/2023), Hypercholesterolemia (08/24/2022), Pain of upper abdomen (06/22/2023), Pruritic erythematous rash (10/17/2022), Pruritic rash (10/17/2022), Restlessness and agitation (06/22/2023), and Tremor  (06/22/2023).    Surgical History  She has a past surgical history that includes Hysterectomy.     Social History  She reports that she has never smoked. She has never used smokeless tobacco. She reports that she does not drink alcohol and does not use drugs.    Family History  Family History   Problem Relation Name Age of Onset    Frontotemporal dementia Mother      Parkinsonism Father          Allergies  Codeine    Review of Systems  CONSTITUTIONAL:   Negative for weight change, loss of appetite, fatigue, weakness, fever, chills, night sweats, headaches .           HEENT:   Negative for cold, cough, sore throat, sinus pain, swollen lymph nodes.           OPHTHALMOLOGY:   Negative for diminished vision, blurred vision, loss of vision, double vision.           ALLERGY:   Negative for runny nose, scratchy throat, sinus congestion, rash, facial pressure, nasal congestion, post-nasal drip.           CARDIOLOGY:   Negative for chest pain, palpitations, murmurs, irregular heart beat, shortness of breath, leg edema, dyspnea on exertion, fatigue, dizziness.           RESPIRATORY:   Negative for chest pain, shortness of breath, swelling of the legs, asthma/copd, chest congestion, pain with breathing .           GASTROENTEROLOGY:   Negative for nausea, vomitting, heartburn, constipation, diarrhea, blood in stool, change in bowel habits, black stool.           HEMATOLOGY/LYMPH:   Negative for fatigue, loss of appetitie, easy bruising, easy bleeding, anemia, abnormal bleeding, slow healing.           ENDOCRINOLOGY:   Negative for polyuria, polydipsia, polyphagia, fatigue, weight loss, weight gain, cold intolerance, heat intolerance, diabetes.           MUSCULOSKELETAL:   Positive  for BILATERAL lower legs and feet /low back pan        DERMATOLOGY:   Negative for rash, bruising.           NEUROLOGY:   Negative for tingling, numbness, gait abnormality, paresthesias, weakness, sciatica.        Examination:  BILATERAL Lumbar Spine  L4 and L5 with Radiculitis     Edema: Negative.   TART Findings: Positive  Tissue Texture Changes, Asymmetry, Restriction, Tenderness paraspinal muscles lower lumbar spine.   Ecchymosis/Bruising: Negative.   Percussion Test (LUMBAR): Negative.   Tuning Fork Test (LUMBAR): Negative.   Percussion (Sacrum): Negative.   Tuning Fork (Sacrum): Negative.     Orientation:  Orientation (LUMBAR): Positive  Decreased Lumbar Lordosis due to muscle spasms.   Orientation (Sacrum):  Positive  Decreased Sacral Flexion/Extension.     ROM (LUMBAR):   Positive  Decreased due to pain, Forward Flexion, Extension, Lateral Bending (Side Bending), and Twisting (Rotation).     Sacrum:  Standing Flexion Test: Positive  Right  Seated Flexion Test: Positive Right  Spring Test: Negative   Sacral Somatic Dysfunction: Positive LrLa: Left rotation Left axis  Hip Flexor Tightness: Positive RIGHT > Left  Hamstring Tightness: Positive LEFT > Right               Muscle Strength: Positive  Decreased  +4/+5 Hamstring Flexion Right worse than left  +5/+5 Quadricep Extension  +5/+5 Hip Flexion  +5/+5 Hip Extension  +5/+5 Hip ABduction toward body  +5/+5 Hip ADduction away from body  +5/+5 Hip Internal Rotation at 90 Degrees  +5/+5 Hip External Rotation at 90 Degrees  +5/+5 Hip Internal Rotation at 0 Degrees  +5/+5 Hip External Rotation at 0 Degrees.            DTR/Neurological: 2-Point Discrimination:  Negative,Toe Walk normal,Heel Walk normal   +2/+4 Patellar Reflex (L-4)  +2/+4 Posterior Tibialis and Medial Hamstrings Reflex (L5)  +2/+4 Achilles Reflex (S-1).            Sensation/Neurological Lumbar: Positive  Decreased  Positive  Decreased  Negative L1: Low back, hips, and groin  Negative L2: Low back and front of inside of upper leg/thigh  Negative L3: Low back and front of upper leg/thigh  Positive  Decreased L4: Low back, front of upper leg/thigh, front of lower leg/calf, front of medial area of knee, and inside of ankle RIGHT WORSE THAN  LEFT  Positive  Decreased L5: Low back, front and lateral knee, front and outside of lower leg/calf, top and bottom of foot and first four toes especially big toe. RIGHT WORSE THAN LEFT           Sensation/Neurological Sacrum:   Negative  Sensation Intact, 2 -Point Discrimination Test: Negative   Negative S1: low back, back of upper leg/thigh, back and inside of lower leg, calf and little toe  Negative S2: Buttocks, genitals, back of upper leg/thigh and calves  Negative S3: Buttocks and genitals  Negative S4: Buttocks  Negative S5: Buttocks.     Sensation/Neurological Coccygeal:   Negative Sensation Intact, 2-Point Discrimination: Negative   Negative Coccyx: Buttocks and area of tailbone.     Palpation: Positive  Tender to Palpation over the BILATERAL Lumbar Spine as well as the Paraspinal Musculature, and SI joint            Vascular:   Capillary Refill < 2 seconds  +2/+4Carotid  +2/+4 Dorsalis Pedis  +2/+4 Posterior Tibial.                Low Back-Disc Injury:  Valsalva Maneuver: Negative.   Fermoral Nerve Traction Test: Positive   Slump Test: Positive    Cross Test Seated: Positive    Seated Straight Leg Raise: Positive    Laseague Sign: Positive    Laseague Differential Test: Positive    [place] Drop Test: Positive    Seated L[place]ague Test: Positive     Reverse Laseague Test: Positive    Tip Toe Heel Walking Test: Negative.   Dorcas Prone Knee Flexion Test: Positive      Multifidus Toe Touch Test (MT3): Negative.  Prone Instability Test (PIT): Negative  Multifidus Lift Test (MLT): Negative       Low Back-SI Joint:  Three-Phase Hyperextension Test: Positive    Yeoman Test: Negative.   Sacroilliac Stress Test: Positive     Abduction Stress Test: Positive            Low Back-Spondy:  Stork Test: Negative.   Sphinx Test: Negative.   Modified Sphinx Test: Negative.         Feet/Foot:   Positive  BILATERAL Valgus foot   ----------------------------------------------------  Examination:  BILATERAL Anterior medial and  posterior medial tibia bilateral right more than left as well as between fourth and fifth metatarsals as well as third and fourth metatarsals bilateral  Erythema: Negative.   Edema: Positive    Effusion: Negative.   Warmth: Negative.   Ecchymosis/Bruising: Positive    Percussion Test: Positive  Tenderness to Palpation over Tibia. Mid to distal tibial B/L anterior posterior medial; TTP between 3rd and 4th Metatarsals more than 2nd and 3rd, worse on the right  Tuning Fork Test: Positive Tenderness to Palpation over Tibia. Mid to distal tibial B/L anterior posterior medial; TTP between 3rd and 4th Metatarsals more than 2nd and 3rd, worse on the right  Abrasions: Negative.            Orientation: Positive  Decreased because of swelling. Purplish hue bilateral feet and cold to touch           ROM: FROM, pain with movement.            Muscle Strength:   Positive   +4/+5 Plantar Flexion, Decreased due to pain and swelling  +4/+5 Dorsi Flexion, Decreased due to pain and swelling         +5/+5 Inversion  +5/+5 Eversion          +5/+5 Plantarflexion in combination with inversion  +5/+5 Plantarflexion in combination with eversion        +5/+5 Dorsiflexion in combination with inversion (Posterior Tibialis)  +5/+5 Dorsiflexion in combination with eversion (Peroneal Brevis)  +5/+5 Dorsiflexion in combination with eversion and flexion of great toe (Peroneal Longus).            DTR/Neurological:   Sensation Intact, 2 Point Discrimination Test: Negative  +2/+4 Achilles Tendon Reflex (S1).     Sensation/Neurological Lumbar: Positive  Decreased  Positive  Decreased  Negative L1: Low back, hips, and groin  Negative L2: Low back and front of inside of upper leg/thigh  Negative L3: Low back and front of upper leg/thigh  Positive  Decreased L4: Low back, front of upper leg/thigh, front of lower leg/calf, front of medial area of knee, and inside of ankle RIGHT WORSE THAN LEFT  Positive  Decreased L5: Low back, front and lateral knee, front  and outside of lower leg/calf, top and bottom of foot and first four toes especially big toe. RIGHT WORSE THAN LEFT  Palpation: Positive Tenderness to Palpation over Tibia. Mid to distal tibial B/L anterior posterior medial; TTP between 3rd and 4th Metatarsals more than 2nd and 3rd, worse on the right         Vascular:   +0-1/+4 Dorsalis Pedis bilateral  +2/+4 Posterior Tibialis  Capillary Refill < 2 Seconds.     Leg/Ankle/Foot - Ankle Impingement:  Posterior Ankle Impingement Test: Negative.   Anterior Ankle Impingement Test: Negative.            Leg/Ankle/Foot - Ankle Instability:  Flexibility Test:  Negative.   Anterior Drawer Test:  Negative.   Talar Tilt Test:  Negative.   External Rotation Kleiger Plantar Flexion Test:  Negative.   External Rotation Kleiger Dorsiflexion Test:  Negative.   Squeeze Test:  Positive   Dorsiflexion Test:  Positive    Posterior Tibial or Peroneal Tendon Instability Test:  Negative.   Horizontal Squeeze Test:  Positive   Vertical Squeeze Test:  Positive    Standing/Walking Test:  Positive   Proprioception Test:  Positive   Hop Test:  Positive          Leg/Ankle/Foot - DVT:  Nataliia's Sign: Negative.            Leg/Ankle/Foot - Forefoot:  Strunsky Test:  Negative.   Gaenslen Maneuver Test:  Negative.   Metatarsal Tap Test:Positive  Decreased  Crepitation Test:  Negative.            Leg/Ankle/Foot - Hindfoot Achilles/Calcaneus:  Bowen Compression Test: Negative.   Hoffa Sign: Negative.   Achilles Tendon Tap Test: Negative.   Heel Compression Test: Negative.         Leg/Ankle/Foot - Nerve Irritation:  Tyrone Sign: Negative.   Digital Nerve Stretch Test: Negative.   Tinel Sign: Negative.   Tourniquet Sign: Negative.         Imaging and Diagnostics Review:  Left leg shorter than right leg by the following:  Left leg shorter than right leg by the following:  Iliac crest:  10.8 mm  Sacral base:  6.4 mm  Midline femoral heads:  13.1 mm  Median difference of the left leg being shorter than  right leg is approximately:  9.97 mm    Standing erect pelvis x-ray ordered for leg length discrepancy      X-rays of bilateral tib-fib's ordered as well as bilateral feet and lumbar spine    Assessment   1. Hip flexor tightness, unspecified laterality        2. Pain in both lower legs  XR lumbar spine complete 4+ views    MR lumbar spine wo IV contrast    XR pelvis 1-2 views    XR tibia fibula bilateral 2 views    Referral to Physical Therapy    MR tibia fibula left wo IV contrast    MR tibia fibula right wo IV contrast    Referral to Physical Medicine Rehab      3. Restless leg syndrome  XR tibia fibula bilateral 2 views    Referral to Physical Therapy    MR tibia fibula left wo IV contrast      4. Numbness in both legs  XR lumbar spine complete 4+ views    MR lumbar spine wo IV contrast    Referral to Physical Therapy    MR tibia fibula right wo IV contrast      5. Occasional tremors  Referral to Neurology    MR brain wo IV contrast    Referral to Physical Therapy    Referral to Physical Medicine Rehab      6. Family history of parkinsonism  Referral to Neurology    MR brain wo IV contrast    Referral to Physical Therapy    Referral to Physical Medicine Rehab      7. Family history of dementia  Referral to Neurology    MR brain wo IV contrast    Referral to Physical Therapy    Referral to Physical Medicine Rehab      8. Aching headache  Referral to Neurology    MR brain wo IV contrast    Referral to Physical Therapy      9. Lumbar pain  XR lumbar spine complete 4+ views    MR lumbar spine wo IV contrast    Referral to Physical Therapy      10. Discitis of lumbar region  XR lumbar spine complete 4+ views    MR lumbar spine wo IV contrast    Referral to Physical Therapy      11. Lumbar strain, initial encounter  XR lumbar spine complete 4+ views    MR lumbar spine wo IV contrast    Referral to Physical Therapy      12. Chronic bilateral low back pain with right-sided sciatica  XR lumbar spine complete 4+ views     MR lumbar spine wo IV contrast    Referral to Physical Therapy      13. Leg length difference, acquired  XR pelvis 1-2 views    Referral to Physical Therapy      14. Bilateral foot pain  XR foot 3+ views bilateral    Referral to Physical Therapy      15. Bilateral tibial pain  XR tibia fibula bilateral 2 views    Referral to Physical Therapy    MR tibia fibula left wo IV contrast    MR tibia fibula right wo IV contrast      16. Acquired valgus deformity of foot, unspecified laterality        17. Venous insufficiency of both lower extremities        18. Hamstring tightness        19. Left medial tibial stress syndrome, sequela        20. Somatic dysfunction of sacral spine        21. Nocturnal leg cramps        22. 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            Treatment or Intervention:  May continue to alternate ice and moist heat as needed  ,   Start into physical therapy 1-2 times a week for 10-12 weeks with manual therapy, dry needling, IASTM, and traction , as well as balance REQUEST ALBERTO LYLE  Stressed the importance of wearing shoes with good stability control to help with the biomechanics affecting the spine  ,   Stressed the importance of wearing full foot insoles to help with the biomechanics affecting the spine and to provide cushioning  ,   Recommendation over-the-counter vitamin-D 2 -3000+ milligrams a day, as well as a daily multivitamin.  ,   Recommendation over-the-counter curcumin, turmeric, boswellia, as well as egg shell membrane as directed to aid with joint inflammation.  ,   Recommendation over-the-counter Move Free for joint health.  ,   May take OTC Tylenol Extra Strength or OTC Tylenol Arthritis, taking one every 6-8 hours with food as needed for pain management,   advised regarding the risks and/or potential adverse reactions and/or side effects of any prescribed medications along with any over-the-counter medications or any supplements used. Patient advised to seek  immediate medical care if any adverse reactions occur. The patient and/or patient(s) parent(s) verbalized their understanding  ,   At the patient's next office visit, will provide appropriate 9.0 millimeter heel lift to be placed in the left shoe to accommodate for leg length discrepancy found on standing erect pelvis xray ,   MRI lumbar spine rule herniated disc versus stress fracture versus other  MRI of left and right tib-fib to rule out stress fracture  MRI with and without contrast of the brain to rule out ischemic event causing her tremors  Possibility regenerative injections in the future  Continue follow-ups with Dr. Ballard regularly for her feet and her legs  Referral to neurology Dr. Thornton for workup for essential tremor and family history of Parkinson's  Referral to physical medicine and rehab Timothy Alfonso for essential tremors and bilateral tibia pain  You have been ordered an MRI of the LUMBAR SPINE, BRAIN, LEFT AND RIGHT TIBIA/FIBULA. Once you contact scheduling at [phone] and obtain the date and time of your MRI/MR Arthrogram, contact our office at [phone] to schedule your follow-up appointment to review your results. Please note the results of your imaging will not be discussed over the telephone.   Follow-up after MRI lumbar spine, MRI bilateral tibia and fibula's, and MRI with and without contrast of the brain    Please note that this report has been produced using speech recognition software.  It may contain errors related to grammar, punctuation or spelling.  Electronically signed, but not reviewed.  ROSA MARIA Fregoso, Director of Sports Medicine    STEPHANIE AMARO on 1/16/25 at 4:28 PM.     TIKA Fregoso DO

## 2025-01-16 ENCOUNTER — HOSPITAL ENCOUNTER (OUTPATIENT)
Dept: RADIOLOGY | Facility: CLINIC | Age: 80
Discharge: HOME | End: 2025-01-16
Payer: MEDICARE

## 2025-01-16 ENCOUNTER — OFFICE VISIT (OUTPATIENT)
Dept: SPORTS MEDICINE | Facility: CLINIC | Age: 80
End: 2025-01-16
Payer: MEDICARE

## 2025-01-16 VITALS
DIASTOLIC BLOOD PRESSURE: 70 MMHG | HEIGHT: 62 IN | WEIGHT: 128 LBS | HEART RATE: 72 BPM | SYSTOLIC BLOOD PRESSURE: 118 MMHG | BODY MASS INDEX: 23.55 KG/M2

## 2025-01-16 DIAGNOSIS — M79.662 PAIN IN BOTH LOWER LEGS: ICD-10-CM

## 2025-01-16 DIAGNOSIS — M62.89 HAMSTRING TIGHTNESS: ICD-10-CM

## 2025-01-16 DIAGNOSIS — S39.012A LUMBAR STRAIN, INITIAL ENCOUNTER: ICD-10-CM

## 2025-01-16 DIAGNOSIS — M79.661 PAIN IN BOTH LOWER LEGS: ICD-10-CM

## 2025-01-16 DIAGNOSIS — M89.8X6 BILATERAL TIBIAL PAIN: ICD-10-CM

## 2025-01-16 DIAGNOSIS — R20.0 NUMBNESS IN BOTH LEGS: ICD-10-CM

## 2025-01-16 DIAGNOSIS — Z82.0 FAMILY HISTORY OF PARKINSONISM: ICD-10-CM

## 2025-01-16 DIAGNOSIS — M54.50 LUMBAR PAIN: ICD-10-CM

## 2025-01-16 DIAGNOSIS — R25.1 OCCASIONAL TREMORS: ICD-10-CM

## 2025-01-16 DIAGNOSIS — M21.70 LEG LENGTH DIFFERENCE, ACQUIRED: ICD-10-CM

## 2025-01-16 DIAGNOSIS — G57.63 MORTON'S NEUROMA OF BOTH FEET: ICD-10-CM

## 2025-01-16 DIAGNOSIS — G25.81 RESTLESS LEG SYNDROME: ICD-10-CM

## 2025-01-16 DIAGNOSIS — R51.9 ACHING HEADACHE: ICD-10-CM

## 2025-01-16 DIAGNOSIS — M54.41 CHRONIC BILATERAL LOW BACK PAIN WITH RIGHT-SIDED SCIATICA: ICD-10-CM

## 2025-01-16 DIAGNOSIS — M79.671 BILATERAL FOOT PAIN: ICD-10-CM

## 2025-01-16 DIAGNOSIS — M46.46 DISCITIS OF LUMBAR REGION: ICD-10-CM

## 2025-01-16 DIAGNOSIS — M24.559 HIP FLEXOR TIGHTNESS, UNSPECIFIED LATERALITY: Primary | ICD-10-CM

## 2025-01-16 DIAGNOSIS — M79.672 BILATERAL FOOT PAIN: ICD-10-CM

## 2025-01-16 DIAGNOSIS — R45.1 RESTLESSNESS: ICD-10-CM

## 2025-01-16 DIAGNOSIS — G89.29 CHRONIC BILATERAL LOW BACK PAIN WITH RIGHT-SIDED SCIATICA: ICD-10-CM

## 2025-01-16 DIAGNOSIS — I87.2 VENOUS INSUFFICIENCY OF BOTH LOWER EXTREMITIES: ICD-10-CM

## 2025-01-16 DIAGNOSIS — G47.62 NOCTURNAL LEG CRAMPS: ICD-10-CM

## 2025-01-16 DIAGNOSIS — M21.079 ACQUIRED VALGUS DEFORMITY OF FOOT, UNSPECIFIED LATERALITY: ICD-10-CM

## 2025-01-16 DIAGNOSIS — S86.892S LEFT MEDIAL TIBIAL STRESS SYNDROME, SEQUELA: ICD-10-CM

## 2025-01-16 DIAGNOSIS — M99.04 SOMATIC DYSFUNCTION OF SACRAL SPINE: ICD-10-CM

## 2025-01-16 DIAGNOSIS — Z81.8 FAMILY HISTORY OF DEMENTIA: ICD-10-CM

## 2025-01-16 PROBLEM — S86.899A MEDIAL TIBIAL STRESS SYNDROME: Status: ACTIVE | Noted: 2025-01-16

## 2025-01-16 PROCEDURE — 1036F TOBACCO NON-USER: CPT | Performed by: FAMILY MEDICINE

## 2025-01-16 PROCEDURE — 1159F MED LIST DOCD IN RCRD: CPT | Performed by: FAMILY MEDICINE

## 2025-01-16 PROCEDURE — 1125F AMNT PAIN NOTED PAIN PRSNT: CPT | Performed by: FAMILY MEDICINE

## 2025-01-16 PROCEDURE — 99205 OFFICE O/P NEW HI 60 MIN: CPT | Performed by: FAMILY MEDICINE

## 2025-01-16 PROCEDURE — 72110 X-RAY EXAM L-2 SPINE 4/>VWS: CPT

## 2025-01-16 PROCEDURE — 1123F ACP DISCUSS/DSCN MKR DOCD: CPT | Performed by: FAMILY MEDICINE

## 2025-01-16 PROCEDURE — 72170 X-RAY EXAM OF PELVIS: CPT

## 2025-01-16 PROCEDURE — 73590 X-RAY EXAM OF LOWER LEG: CPT | Mod: 50

## 2025-01-16 PROCEDURE — 99215 OFFICE O/P EST HI 40 MIN: CPT | Performed by: FAMILY MEDICINE

## 2025-01-16 PROCEDURE — 73630 X-RAY EXAM OF FOOT: CPT | Mod: 50

## 2025-01-16 RX ORDER — UBIDECARENONE 30 MG
30 CAPSULE ORAL 2 TIMES DAILY
COMMUNITY

## 2025-01-16 RX ORDER — VIT C/E/ZN/COPPR/LUTEIN/ZEAXAN 250MG-90MG
25 CAPSULE ORAL
COMMUNITY

## 2025-01-16 RX ORDER — BISMUTH SUBSALICYLATE 262 MG
1 TABLET,CHEWABLE ORAL DAILY
COMMUNITY

## 2025-01-16 ASSESSMENT — COLUMBIA-SUICIDE SEVERITY RATING SCALE - C-SSRS
6. HAVE YOU EVER DONE ANYTHING, STARTED TO DO ANYTHING, OR PREPARED TO DO ANYTHING TO END YOUR LIFE?: NO
2. HAVE YOU ACTUALLY HAD ANY THOUGHTS OF KILLING YOURSELF?: NO
1. IN THE PAST MONTH, HAVE YOU WISHED YOU WERE DEAD OR WISHED YOU COULD GO TO SLEEP AND NOT WAKE UP?: NO

## 2025-01-16 ASSESSMENT — ENCOUNTER SYMPTOMS
LOSS OF SENSATION IN FEET: 0
DEPRESSION: 0
OCCASIONAL FEELINGS OF UNSTEADINESS: 0

## 2025-01-16 ASSESSMENT — PATIENT HEALTH QUESTIONNAIRE - PHQ9
1. LITTLE INTEREST OR PLEASURE IN DOING THINGS: NOT AT ALL
2. FEELING DOWN, DEPRESSED OR HOPELESS: NOT AT ALL
SUM OF ALL RESPONSES TO PHQ9 QUESTIONS 1 AND 2: 0

## 2025-01-16 ASSESSMENT — PAIN SCALES - GENERAL
PAINLEVEL_OUTOF10: 4
PAINLEVEL_OUTOF10: 4

## 2025-01-16 ASSESSMENT — PAIN - FUNCTIONAL ASSESSMENT: PAIN_FUNCTIONAL_ASSESSMENT: 0-10

## 2025-01-16 ASSESSMENT — PAIN DESCRIPTION - DESCRIPTORS: DESCRIPTORS: ACHING;SHOOTING

## 2025-01-16 NOTE — PATIENT INSTRUCTIONS
May continue to alternate ice and moist heat as needed  ,   Start into physical therapy 1-2 times a week for 10-12 weeks with manual therapy, dry needling, IASTM, and traction , REQUEST ALBERTO LYLE  Stressed the importance of wearing shoes with good stability control to help with the biomechanics affecting the spine  ,   Stressed the importance of wearing full foot insoles to help with the biomechanics affecting the spine and to provide cushioning  ,   Recommendation over-the-counter calcium with vitamin-D 2 -3000+ milligrams a day, as well as OTC symphytum as directed daily to promote bony healing, in addition to a daily multivitamin.  ,   Recommendation over-the-counter curcumin, turmeric, boswellia, as well as egg shell membrane as directed to aid with joint inflammation.  ,   Recommendation over-the-counter Move Free for joint health.  ,   May take OTC Tylenol Extra Strength or OTC Tylenol Arthritis, taking one every 6-8 hours with food as needed for pain management,   advised regarding the risks and/or potential adverse reactions and/or side effects of any prescribed medications along with any over-the-counter medications or any supplements used. Patient advised to seek immediate medical care if any adverse reactions occur. The patient and/or patient(s) parent(s) verbalized their understanding  ,   At the patient's next office visit, will provide appropriate --- millimeter heel lift to be placed in the --- shoe to accommodate for leg length discrepancy found on standing erect pelvis xray ,   You have been ordered an MRI of the LUMBAR SPINE, BRAIN, LEFT AND RIGHT TIBIA/FIBULA. Once you contact scheduling at [phone] and obtain the date and time of your MRI/MR Arthrogram, contact our office at [phone] to schedule your follow-up appointment to review your results. Please note the results of your imaging will not be discussed over the telephone.   Follow-up after MRI

## 2025-02-04 ENCOUNTER — HOSPITAL ENCOUNTER (OUTPATIENT)
Dept: RADIOLOGY | Facility: CLINIC | Age: 80
Discharge: HOME | End: 2025-02-04
Payer: MEDICARE

## 2025-02-04 DIAGNOSIS — M54.41 CHRONIC BILATERAL LOW BACK PAIN WITH RIGHT-SIDED SCIATICA: ICD-10-CM

## 2025-02-04 DIAGNOSIS — R20.0 NUMBNESS IN BOTH LEGS: ICD-10-CM

## 2025-02-04 DIAGNOSIS — M89.8X6 BILATERAL TIBIAL PAIN: ICD-10-CM

## 2025-02-04 DIAGNOSIS — M79.662 PAIN IN BOTH LOWER LEGS: ICD-10-CM

## 2025-02-04 DIAGNOSIS — Z81.8 FAMILY HISTORY OF DEMENTIA: ICD-10-CM

## 2025-02-04 DIAGNOSIS — S39.012A LUMBAR STRAIN, INITIAL ENCOUNTER: ICD-10-CM

## 2025-02-04 DIAGNOSIS — M79.661 PAIN IN BOTH LOWER LEGS: ICD-10-CM

## 2025-02-04 DIAGNOSIS — Z82.0 FAMILY HISTORY OF PARKINSONISM: ICD-10-CM

## 2025-02-04 DIAGNOSIS — R25.1 OCCASIONAL TREMORS: ICD-10-CM

## 2025-02-04 DIAGNOSIS — G89.29 CHRONIC BILATERAL LOW BACK PAIN WITH RIGHT-SIDED SCIATICA: ICD-10-CM

## 2025-02-04 DIAGNOSIS — R51.9 ACHING HEADACHE: ICD-10-CM

## 2025-02-04 DIAGNOSIS — G25.81 RESTLESS LEG SYNDROME: ICD-10-CM

## 2025-02-04 DIAGNOSIS — M54.50 LUMBAR PAIN: ICD-10-CM

## 2025-02-04 DIAGNOSIS — M46.46 DISCITIS OF LUMBAR REGION: ICD-10-CM

## 2025-02-04 PROCEDURE — 73718 MRI LOWER EXTREMITY W/O DYE: CPT | Mod: LEFT SIDE | Performed by: RADIOLOGY

## 2025-02-04 PROCEDURE — 73718 MRI LOWER EXTREMITY W/O DYE: CPT | Mod: RT

## 2025-02-04 PROCEDURE — 73718 MRI LOWER EXTREMITY W/O DYE: CPT | Mod: RIGHT SIDE | Performed by: RADIOLOGY

## 2025-02-04 PROCEDURE — 73718 MRI LOWER EXTREMITY W/O DYE: CPT | Mod: LT

## 2025-02-04 PROCEDURE — 72148 MRI LUMBAR SPINE W/O DYE: CPT | Performed by: RADIOLOGY

## 2025-02-04 PROCEDURE — 70551 MRI BRAIN STEM W/O DYE: CPT | Performed by: RADIOLOGY

## 2025-02-04 PROCEDURE — 70551 MRI BRAIN STEM W/O DYE: CPT

## 2025-02-04 PROCEDURE — 72148 MRI LUMBAR SPINE W/O DYE: CPT

## 2025-02-10 NOTE — PROGRESS NOTES
Verbal consent of the patient and/or verbal parental consent for patients under the age of 18 have been obtained to conduct a physical examination at this office visit.    Established patient  History Of Present Illness  02/13/25 Carmita Araiza is a 79 y.o. female who presents for MRI review of their BILATERAL legs/low back pain. States that she feels relatively unchanged since her last appointment. Overall 4/10 pain in the lower legs only at night time. She arrives wearing new supportive shoes and has purchased powerstep insoles. She has not yet scheduled physical therapy which I told her she needs to do so as soon as possible and I want her to work with only with Milton Santacruz for her balance and her tremor.  Also additionally the goal is going to be to get her back into being able to do her ballroom dancing.  She spent an afternoon in the ED where she was having dizziness spells and followed up with cardiology and was cleared of any heart issues at the time. She followed up with neurology Dr. Thornton and feels that he was being dismissive at the appointment and was not pleased overall and additionally he said he wanted to do an EMG nerve conduction test on her that she did not feel comfortable doing with him since he was so dismissive and ultimately said that she just thought she had some type of essential or nonessential tremor.  However I told her she should be happy her MRI of her brain as well as her CT scan of her brain both were negative so she knows also there is nothing going on with her head such as stroke or any thing else severe.   I told her I still would like her to get into see physical medicine and rehab Dany Alfonso additionally for this essential tremor and also to get her EMG nerve conduction test done.  Additionally I explained to her based off of the MRIs we additionally did that she does have some bulging disks and protrusions that is impinging on one of her nerves the L4 nerve root where she  is complaining of pain going down her right leg that she was initially referred into me by vascular Dr. Ballard.  I recommended that she gets her EMG/nerve conduction test by him and ultimately epidural injection done at his office.  However additionally I told her based off of her MRIs of her lower legs she was very concerned of some type of fractures or other issues that they were all negative however like initially that I told her I thought she had some meniscus issues going on in both knees and arthritic degenerative changes they can also refer pain down her legs that the MRIs of the tib-fib's commented a little bit about the degenerative changes and also the meniscus issues with the recommendation to get MRIs of the knees for further evaluation and workup.  I told her based off of this we can do some viscosupplementation injections as well as regenerative injections.  Additionally I told her I want her to get back into see Dr. Ballard as well since at the last visit Dr. Ballard said wait until she gets the MRI and CT scans of her brain done and once there negative then to come back to her office for further evaluation and workup for her lower legs.  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  I told her she needs to really focus on taking care of herself first so she can get back to doing what she loves #1 and ultimately now we know her big things are ruled out with her brain and her heart and all this other stuff is trivial we will be able to figure it out.  I have never seen a couple so intune with each other in my life!!!!  Absolutely amazing!!!!!    All previous Progress Notes and imaging results related to this patients chief  complaint have been reviewed in preparation for this examination.    Past Medical History  She has a past medical history of Epigastric pain (06/22/2023), Hypercholesterolemia (08/24/2022), Pain of upper abdomen (06/22/2023), Pruritic erythematous rash (10/17/2022), Pruritic rash (10/17/2022), Restlessness and agitation (06/22/2023), and Tremor (06/22/2023).    Surgical History  She has a past surgical history that includes Hysterectomy.     Social History  She reports that she has never smoked. She has never used smokeless tobacco. She reports that she does not drink alcohol and does not use drugs.    Family History  Family History   Problem Relation Name Age of Onset    Frontotemporal dementia Mother      Parkinsonism Father       History Of Present Illness  01/16/25 Carmita Araiza is a 79 y.o. female who presents for an evaluation of their BILATERAL legs/low back pain.  She was graciously referred in by Dr. Ballard. States that she has been experiencing lower leg pain with tingling on the anterior portion of the legs right worse than left for approximately 6 months. States her pain is a consistent 4/10 pain right worse than left. She experiences restless leg syndrome and leg tremors right more than left and was being treated for that by Dr. Ballard. States that she uses Bengay and magnesium cream for her lower leg pain. 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 She has a history of foot problems from years of dancing in heels.  NSAIDs and Tylenol does not help.  She states that she has been experiencing headaches for 2 week, also noted tremors on right side more than left with vision issues.  She denies knee pain and back pain.  Additionally there is family history: parkinson's (father), frontal lobe dementia (mother).  She says the majority of her pain are in both of her shin areas and she points from mid to distal shins as well as into both of  her feet.  She says her feet are additionally messed up with neuropathy because of how much dancing she has done.     Allergies  Codeine    Review of Systems  CONSTITUTIONAL:   Negative for weight change, loss of appetite, fatigue, weakness, fever, chills, night sweats, headaches .           HEENT:   Negative for cold, cough, sore throat, sinus pain, swollen lymph nodes.           OPHTHALMOLOGY:   Negative for diminished vision, blurred vision, loss of vision, double vision.           ALLERGY:   Negative for runny nose, scratchy throat, sinus congestion, rash, facial pressure, nasal congestion, post-nasal drip.           CARDIOLOGY:   Negative for chest pain, palpitations, murmurs, irregular heart beat, shortness of breath, leg edema, dyspnea on exertion, fatigue, dizziness.           RESPIRATORY:   Negative for chest pain, shortness of breath, swelling of the legs, asthma/copd, chest congestion, pain with breathing .           GASTROENTEROLOGY:   Negative for nausea, vomitting, heartburn, constipation, diarrhea, blood in stool, change in bowel habits, black stool.           HEMATOLOGY/LYMPH:   Negative for fatigue, loss of appetitie, easy bruising, easy bleeding, anemia, abnormal bleeding, slow healing.           ENDOCRINOLOGY:   Negative for polyuria, polydipsia, polyphagia, fatigue, weight loss, weight gain, cold intolerance, heat intolerance, diabetes.           MUSCULOSKELETAL:   Positive  for BILATERAL lower legs and feet /low back pan        DERMATOLOGY:   Negative for rash, bruising.           NEUROLOGY:   Negative for tingling, numbness, gait abnormality, paresthesias, weakness, sciatica.        Examination: All findings relatively unchanged since previous visit  BILATERAL Lumbar Spine L4 and L5 with Radiculitis     Edema: Negative.   TART Findings: Positive  Tissue Texture Changes, Asymmetry, Restriction, Tenderness paraspinal muscles lower lumbar spine.   Ecchymosis/Bruising: Negative.   Percussion Test  (LUMBAR): Negative.   Tuning Fork Test (LUMBAR): Negative.   Percussion (Sacrum): Negative.   Tuning Fork (Sacrum): Negative.     Orientation:All findings relatively unchanged since previous visit  Orientation (LUMBAR): Positive still some slight decreased Lumbar Lordosis due to muscle spasms however improved tremendously since last visit.   Orientation (Sacrum):  Positive  Decreased Sacral Flexion/Extension.  Improved since last visit    ROM (LUMBAR): All findings relatively unchanged since previous visit  Positive  Decreased some with, Forward Flexion and Extension     Sacrum:  Standing Flexion Test: Positive  Right  Seated Flexion Test: Positive Right  Spring Test: Negative   Sacral Somatic Dysfunction: Positive LrLa: Left rotation Left axis  Hip Flexor Tightness: Positive RIGHT > Left  Hamstring Tightness: Positive LEFT > Right               Muscle Strength: Positive  Decreased All findings relatively unchanged since previous visit  +4/+5 Hamstring Flexion Right worse than left  +5/+5 Quadricep Extension  +5/+5 Hip Flexion  +5/+5 Hip Extension  +5/+5 Hip ABduction toward body  +5/+5 Hip ADduction away from body  +5/+5 Hip Internal Rotation at 90 Degrees  +5/+5 Hip External Rotation at 90 Degrees  +5/+5 Hip Internal Rotation at 0 Degrees  +5/+5 Hip External Rotation at 0 Degrees.            DTR/Neurological: 2-Point Discrimination:  Negative,Toe Walk normal,Heel Walk normal   +2/+4 Patellar Reflex (L-4)  +2/+4 Posterior Tibialis and Medial Hamstrings Reflex (L5)  +2/+4 Achilles Reflex (S-1).            Sensation/Neurological Lumbar: Positive  Decreased All findings relatively unchanged since previous visit  Positive  Decreased  Negative L1: Low back, hips, and groin  Negative L2: Low back and front of inside of upper leg/thigh  Negative L3: Low back and front of upper leg/thigh  Positive  Decreased L4: Low back, front of upper leg/thigh, front of lower leg/calf, front of medial area of knee, and inside of ankle  RIGHT WORSE THAN LEFT  Positive  Decreased L5: Low back, front and lateral knee, front and outside of lower leg/calf, top and bottom of foot and first four toes especially big toe. RIGHT WORSE THAN LEFT           Sensation/Neurological Sacrum:   Negative  Sensation Intact, 2 -Point Discrimination Test: Negative   Negative S1: low back, back of upper leg/thigh, back and inside of lower leg, calf and little toe  Negative S2: Buttocks, genitals, back of upper leg/thigh and calves  Negative S3: Buttocks and genitals  Negative S4: Buttocks  Negative S5: Buttocks.     Sensation/Neurological Coccygeal:   Negative Sensation Intact, 2-Point Discrimination: Negative   Negative Coccyx: Buttocks and area of tailbone.     Palpation: Positive still some tender to Palpation over the BILATERAL Lumbar Spine as well as the Paraspinal Musculature, and right greater than left SI joint  All findings relatively unchanged since previous visit           Vascular:   Capillary Refill < 2 seconds  +2/+4Carotid  +2/+4 Dorsalis Pedis  +2/+4 Posterior Tibial.                Low Back-Disc Injury: All findings relatively unchanged since previous visit  Valsalva Maneuver: Negative.   Fermoral Nerve Traction Test: Positive Right side causes right sided pain  Slump Test: Positive  Right side causes right sided pain  Cross Test Seated: Positive    Seated Straight Leg Raise: Positive    Laseague Sign: Positive    L[place]ague Differential Test: Positive    [place] Drop Test: Positive    Seated [place]ague Test: Positive     Reverse [place]aseague Test: Positive    Tip Toe Heel Walking Test: Negative.   Dorcas Prone Knee Flexion Test: Positive      Multifidus Toe Touch Test (MT3): Negative.  Prone Instability Test (PIT): Negative  Multifidus Lift Test (MLT): Negative       Low Back-SI Joint: All findings relatively unchanged since previous visit  Three-Phase Hyperextension Test: Positive    Yeoman Test: Negative.   Sacroilliac Stress Test: Positive     Abduction  Stress Test: Positive            Low Back-Spondy:  Stork Test: Negative.   Sphinx Test: Negative.   Modified Sphinx Test: Negative.         Feet/Foot:   Positive  BILATERAL Valgus foot   ----------------------------------------------------  Examination:All findings relatively unchanged since previous visit  BILATERAL Anterior medial and posterior medial tibia bilateral right more than left as well as between fourth and fifth metatarsals as well as third and fourth metatarsals bilateral  Erythema: Negative.   Edema: Negative   effusion: Negative.   Warmth: Negative.   Ecchymosis/Bruising: Negative  Percussion Test: Positive still some mild discomfort over Tibia. Mid to distal tibial B/L anterior posterior medial; TTP between 3rd and 4th Metatarsals more than 2nd and 3rd, worse on the right  Tuning Fork Test: Positive still some mild discomfort over Tibia. Mid to distal tibial B/L anterior posterior medial; TTP between 3rd and 4th Metatarsals more than 2nd and 3rd, worse on the right  Abrasions: Negative.            Orientation: Positive  Decreased because of swelling. Purplish hue bilateral feet and cold to touch All findings relatively unchanged since previous visit           ROM: FROM            Muscle Strength:     +5/+5 Plantar Flexion  +5/+5 Inversion  +5/+5 Eversion          +5/+5 Plantarflexion in combination with inversion  +5/+5 Plantarflexion in combination with eversion        +5/+5 Dorsiflexion in combination with inversion (Posterior Tibialis)  +5/+5 Dorsiflexion in combination with eversion (Peroneal Brevis)  +5/+5 Dorsiflexion in combination with eversion and flexion of great toe (Peroneal Longus).            DTR/Neurological:   Sensation Intact, 2 Point Discrimination Test: Negative  +2/+4 Achilles Tendon Reflex (S1).     Sensation/Neurological Lumbar: Positive  Decreased All findings relatively unchanged since previous visit  Positive  Decreased  Negative L1: Low back, hips, and groin  Negative L2:  Low back and front of inside of upper leg/thigh  Negative L3: Low back and front of upper leg/thigh  Positive  Decreased L4: Low back, front of upper leg/thigh, front of lower leg/calf, front of medial area of knee, and inside of ankle RIGHT WORSE THAN LEFT  Positive  Decreased L5: Low back, front and lateral knee, front and outside of lower leg/calf, top and bottom of foot and first four toes especially big toe. RIGHT WORSE THAN LEFT    Palpation: Negative Tenderness to Palpation over Tibia. Mid to distal tibial B/L anterior posterior medial; TTP between 3rd and 4th Metatarsals more than 2nd and 3rd, worse on the right         Vascular: All findings relatively unchanged since previous visit  Positive  +0-1/+4 Dorsalis Pedis bilateral  +2/+4 Posterior Tibialis  Capillary Refill < 2 Seconds.     Leg/Ankle/Foot - Ankle Impingement:  Posterior Ankle Impingement Test: Negative.   Anterior Ankle Impingement Test: Negative.            Leg/Ankle/Foot - Ankle Instability:All findings relatively unchanged since previous visit  Flexibility Test:  Negative.   Anterior Drawer Test:  Negative.   Talar Tilt Test:  Negative.   External Rotation Kleiger Plantar Flexion Test:  Negative.   External Rotation Kleiger Dorsiflexion Test:  Negative.   Squeeze Test:  Negative   Dorsiflexion Test:  Positive    Posterior Tibial or Peroneal Tendon Instability Test:  Negative.   Horizontal Squeeze Test:  Positive   Vertical Squeeze Test:  Positive    Standing/Walking Test:  Positive   Proprioception Test:  Positive   Hop Test:  Positive          Leg/Ankle/Foot - DVT:  Nataliia's Sign: Negative.            Leg/Ankle/Foot - Forefoot:All findings relatively unchanged since previous visit  Strunsky Test:  Negative.   Gaenslen Maneuver Test:  Negative.   Metatarsal Tap Test:Positive  Decreased  Crepitation Test:  Negative.            Leg/Ankle/Foot - Hindfoot Achilles/Calcaneus:  Bowen Compression Test: Negative.   Hoffa Sign: Negative.   Achilles  Tendon Tap Test: Negative.   Heel Compression Test: Negative.         Leg/Ankle/Foot - Nerve Irritation:  Tyrone Sign: Negative.   Digital Nerve Stretch Test: Negative.   Tinel Sign: Negative.   Tourniquet Sign: Negative.         Imaging and Diagnostics Review:  X-rays and MRIs of bilateral knees ordered  ------------------------------------------  MR TIBIA FIBULA LEFT WO IV CONTRAST; ;  2/4/2025 10:14 am      INDICATION:  Signs/Symptoms:LEFT TIBIA PAIN.      ,M79.661 Pain in right lower leg,M79.662 Pain in left lower  leg,G25.81 Restless legs syndrome,M89.8X6 Other specified disorders  of bone, lower leg      COMPARISON:  X-ray 01/16/2025      ACCESSION NUMBER(S):  [id number]      ORDERING CLINICIAN:  STEPHANIE AMARO      TECHNIQUE:  Multiplanar multisequence MRI obtained of the left tibia/fibula.      FINDINGS:  Left tibia demonstrates normal marrow signal intensity. No cortical  irregularity. No periosteal edema. No sequela of stress reaction or  stress fracture. Fibula demonstrates no cortical or intramedullary  abnormality.      Musculature within the compartments of the left calf demonstrate no  asymmetric atrophy of the musculature or focal muscle edema. No  space-occupying lesion. No edema in the inter fascial planes. No  significant subcutaneous edema demonstrated.      Common peroneal nerve about the fibular head as well as visualized  course of the peroneal nerves extending distally in the calf  unremarkable within limits of this MRI. Tibial nerve also  demonstrates normal signal intensity without focal abnormality within  the visualized portions of the calf.      Achilles tendon distally visualized portions intact. Left knee joint  characterization given limited given the large field of view.  However, there is suggestion of the longitudinal horizontal tear  along the inferior articular surface of the body of the medial  meniscus. No knee joint effusion. Included portions visualized ankle  joint  demonstrates no definite effusion.            IMPRESSION MRI of the left tib-fib February 4, 2025:  1. No stress reaction or sequela of fracture of the left calf.   2. No muscle strain in the left calf. Visualized course of the nerves  unremarkable within limits of this MRI  3. Suggestion of the longitudinal horizontal tear along the inferior  articular surface of the body of the medial meniscus within limits of  this of the this MRI given the large field of view. Follow up as  clinically indicated.    Signed by: Ramy Arrington 2/4/2025 10:49 AM  ---------------------------------------------------------  MR TIBIA FIBULA RIGHT WO IV CONTRAST; ;  2/4/2025 10:05 am      INDICATION:  Signs/Symptoms:RIGHT TIBIA PAIN.      ,M79.661 Pain in right lower leg,M79.662 Pain in left lower leg,R20.0  Anesthesia of skin,M89.8X6 Other specified disorders of bone, lower  leg      COMPARISON:  None.      ACCESSION NUMBER(S):  [id number]      ORDERING CLINICIAN:  STEPHANIE AMARO      TECHNIQUE:  Multiplanar multisequence MRI obtained of the right tibia/fibula.      FINDINGS:  Right tibia demonstrates normal marrow signal intensity. No endosteal  marrow edema or evidence for periosteal edema. No cortical  irregularity or sequela of stress reaction/stress fracture. Fibula  demonstrates no cortical or intramedullary abnormality.      Musculature within the compartments of the right calf demonstrate no  asymmetric atrophy or edema. No edema in the inter fascial planes. No  focal fluid collection identified. Visualized common peroneal nerve  about the fibular head is unremarkable within limits of this MRI.  Peroneal nerves distally unremarkable within limits of this MRI. No  asymmetric edema or soft tissue abnormality. Tibial nerve visualized  portions is unremarkable.      Achilles tendon visualized portions unremarkable. Included portions  visualized knee joint unremarkable within limits of this MRI given  the large field of view. There  is component of mild medial  femorotibial and patellofemoral osteoarthritis. Included portions  visualized ankle joint demonstrates no effusion.              IMPRESSION MRI of the right tibia February 4, 2025:  1. No stress reaction or sequela of stress fracture of the right  tibia/fibula  2. No asymmetric atrophy of the musculature within the compartments  of the right calf. No focal muscle strain.  3. Visualized course of the nerves within the calf unremarkable  within limits of this MRI. No asymmetric edema or space-occupying  lesion demonstrated.  4. 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      ,M79.661 Pain in right lower leg,M79.662 Pain in left lower leg,R20.0  Anesthesia of skin,M54.50 Low back pain, unspecified,M46.46 Discitis,  unspecified, lumbar region,S39.012A Strain of muscle, fascia and  tendon of lower back, initial encounter,M54.41 Lumbago with sciatica,  right side,G89.29 Other chronic pain      COMPARISON:  X-ray 01/16/2025      ACCESSION NUMBER(S):  [id number]      ORDERING CLINICIAN:  STEPHANIE AMARO      TECHNIQUE:  Multiplanar multisequence MRI obtained of the lumbar spine.      FINDINGS:  Alignment of the lumbar spine is maintained. Vertebral body heights  are preserved. Multilevel degenerative discogenic changes are  demonstrated. There is moderate loss disc space height at L4/5 with  mild loss disc space height at L1/2 through L3/4. There is a endplate  Schmorl's node formation scratch at L1/2 and L2/3 with mild reactive  marrow edema at L1/2. There is no evidence for pars defect. Moderate  facet arthropathy demonstrated L4/5 and mild facet arthropathy L3/4.      Evaluation of spinal levels are as follows:      T12/L1 demonstrates mild disc bulge.  There is a asymmetric right  paracentral and foraminal disc protrusion measuring 3 mm with mild  asymmetric narrowing of the right lateral recess. There is no  narrowing thecal sac. Foramina demonstrate mild right foraminal  narrowing.      L1/2 demonstrates moderate disc bulge. There is no narrowing of the  thecal sac. Foramina are unremarkable      L2/3 demonstrates moderate circumferential disc bulge with mild  asymmetric left foraminal and paracentral component contributing to  mild left foraminal narrowing. No narrowing of the thecal sac. Right  neural foramina is unremarkable      L3/4 demonstrates moderate disc bulge with asymmetric left foraminal  disc protrusion measuring 3 mm contributing to moderate left  foraminal narrowing. No displacement of the exiting nerve root. Right  neural foramina is unremarkable. There is no narrowing thecal sac  this level.      L4/5 demonstrates diffuse circumferential disc bulge seen in  conjunction with facet arthropathy and ligamentum flavum hypertrophy  with mild asymmetric narrowing of the lateral recesses bilaterally.  No significant displacement of the emerging nerve roots. No narrowing  of the thecal sac. Foramina demonstrate mild left and moderate right  foraminal narrowing. Disc osteophyte complex abuts the exiting right  L4 nerve      L5/S1 demonstrates no narrowing thecal sac. Foramina are unremarkable.      Included portions visualized abdomen demonstrates cysts bilateral  kidneys              IMPRESSION MRI lumbar spine February 4, 2025:  1. Multilevel degenerative discogenic changes and disc bulge as  described above without narrowing of the thecal sac.  2. Right paracentral and foraminal disc protrusion T12/L1 with mild  right foraminal narrowing  3. Left paracentral and foraminal disc protrusion L3/4 contributing  to moderate left foraminal narrowing  4.  Multilevel disc bulges T12-L5  5.  Multilevel neuroforaminal stenosis T12-L1 mild right, L2-L3 mild left,  L3-L4 moderate left, L4-L5 moderate right  6.  Disc osteophyte complex equals disc protrusion plus bony osteophytes at L4-L5 abutting the exiting right L4 nerve  7.  Incidental finding bilateral kidney cysts        Signed by: Ramy Arrington 2/4/2025 11:04 AM  ---------------------------------------------------  MR BRAIN WO IV CONTRAST;  2/4/2025 8:48 am      INDICATION:  Signs/Symptoms:Tremors with headaches.      ,R25.1 Tremor, unspecified,Z82.0 Family history of epilepsy and other  diseases of the nervous system,Z81.8 Family history of other mental  and behavioral disorders,R51.9 Headache, unspecified      COMPARISON:  None.      ACCESSION NUMBER(S):  [id number]      ORDERING CLINICIAN:  STEPHANIE AMARO      TECHNIQUE:  Axial T2, FLAIR, DWI, gradient echo T2 and sagittal and coronal T1  weighted images of brain were acquired.      FINDINGS:  CSF Spaces: The ventricles, sulci and basal cisterns are prominent  compatible with mild volume loss. There is no extra-axial fluid  collection.      Parenchyma: There is no diffusion restriction abnormality to suggest  acute infarct.  Mild nonspecific white matter signal compatible with  microangiopathy. There is no mass effect or midline shift. No  abnormal susceptibility artifact.      Paranasal Sinuses and Mastoids: Visualized paranasal sinuses and  mastoid air cells are predominantly clear. Evidence of bilateral lens  surgery. Major intracranial flow voids at the skull base are  unremarkable..      IMPRESSION:  No evidence of acute infarct, intracranial mass effect or midline  shift.  2. Mild volume loss. Mild nonspecific white matter signal compatible  with microangiopathy.      MACRO:  None      Signed by: Lauren Villar 2/4/2025 11:50 AM  ---------------------------------------------  XR TIBIA FIBULA BILATERAL 2 VIEWS; XR FOOT 3+ VIEWS BILATERAL      INDICATION:  Signs/Symptoms:bilateral tibial pain, right worse than left;  Signs/Symptoms:bilateral foot pain, right  worse than left.      COMPARISON:  None      ACCESSION NUMBER(S):  [id number]; [id number]      ORDERING CLINICIAN:  STEPHANIE AMARO      FINDINGS:  Bilateral lower legs show no acute findings. There is some  degenerative change at the bilateral ankles and mid feet. Advanced  1st metatarsophalangeal osteoarthritis with bunion and hallux valgus  also noted.      IMPRESSION:  Degenerative changes as above.   Some degenerative change at the bilateral ankles and mid feet.   Advanced 1st metatarsophalangeal osteoarthritis with bunion and hallux valgus also noted.  4. No acute findings bilateral feet or lower legs.      Signed by: Kaveh Hassan 1/21/2025 7:51 AM  ----------------------------------  Left leg shorter than right leg by the following:  Left leg shorter than right leg by the following:  Iliac crest:  10.8 mm  Sacral base:  6.4 mm  Midline femoral heads:  13.1 mm  Median difference of the left leg being shorter than right leg is approximately:  9.97 mm    XR PELVIS 1-2 VIEWS      INDICATION:  Signs/Symptoms:PELVIC PAIN.      COMPARISON:  None      ACCESSION NUMBER(S):  [id number]      ORDERING CLINICIAN:  STEPHANIE AMARO      FINDINGS:  Mild osteoarthritis of the bilateral hips and sacroiliac joints. No  evidence of fracture or lesion.          IMPRESSION:  Mild degenerative changes bilateral hips and SI joints.   No acute findings.      Signed by: Kaveh Hassan 1/21/2025 7:52 AM  --------------------------------------------------    XR LUMBAR SPINE COMPLETE 4+ VIEWS      INDICATION:  Signs/Symptoms:LOW BACK PAIN.      COMPARISON:  None      ACCESSION NUMBER(S):  [id number]      ORDERING CLINICIAN:  STEPHANIE AMARO      FINDINGS:  Advanced lumbar degenerative changes at all levels. Mild scoliosis.  No evidence of fracture.      IMPRESSION:  Advanced lumbar degenerative changes.   No acute findings.  Mild scoliosis.      Signed by: Kaveh Hassan 1/21/2025 7:52 AM  Assessment   1. Lumbosacral radiculopathy due to  degenerative joint disease of spine        2. Pain in both lower legs  Referral to Vascular Medicine      3. Restless leg syndrome  Referral to Vascular Medicine      4. Numbness in both legs        5. Occasional tremors        6. Aching headache        7. Lumbar pain        8. Discitis of lumbar region        9. Lumbar spine strain, subsequent encounter        10. Chronic bilateral low back pain with right-sided sciatica        11. Bilateral foot pain        12. Bilateral tibial pain        13. Hip flexor tightness, unspecified laterality        14. Acquired valgus deformity of foot, unspecified laterality        15. Hamstring tightness        16. Somatic dysfunction of sacral spine        17. Nocturnal leg cramps  Referral to Vascular Medicine      18. Baugh's neuroma of both feet  Referral to Vascular Medicine      19. Bilateral chronic knee pain  XR knee 4+ views bilateral    MR knee left wo IV contrast    MR knee right wo IV contrast      20. Chronic patellofemoral pain of both knees  XR knee 4+ views bilateral    MR knee left wo IV contrast    MR knee right wo IV contrast      21. Arthritis of both knees  XR knee 4+ views bilateral    MR knee left wo IV contrast    MR knee right wo IV contrast      22. Meniscal injury, left, initial encounter  XR knee 4+ views bilateral    MR knee left wo IV contrast      23. Meniscal injury, right, initial encounter  XR knee 4+ views bilateral    MR knee right wo IV contrast      24. Bulging lumbar disc        25. Protrusion of lumbar intervertebral disc        26. Disc-osteophyte complex        27. Neural foraminal stenosis of lumbosacral spine        28. Renal cysts, acquired, bilateral        29. Primary osteoarthritis of right knee        30. Primary osteoarthritis of left knee        31. Degenerative tear of medial meniscus of left knee        32. 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  Continue over-the-counter curcumin, turmeric, boswellia, as well as egg shell membrane as directed to aid with joint inflammation.  Recommendation over-the-counter Move Free for joint health.  May take OTC Tylenol Extra Strength or OTC Tylenol Arthritis, taking one every 6-8 hours with food as needed for pain management,   advised regarding the risks and/or potential adverse reactions and/or side effects of any prescribed medications along with any over-the-counter medications or any supplements used. Patient advised to seek immediate medical care if any adverse reactions occur. The patient and/or patient(s) parent(s) verbalized their understanding  Patient given 9.0 millimeter heel lift to be placed in the left shoe to accommodate for leg length discrepancy found on standing erect pelvis xray  Possibility regenerative injections in the future as well as viscosupplementation injections or combination of both  Continue follow-ups with Dr. Ballard regularly for her feet and her legs and referred her back to her at this office visit since she is cleared for any issues with her brain  Referral to physical medicine and rehab Timothy Alfonso for essential tremors and bilateral tibia pain as well as EMG nerve conduction test and most likely epidural L4-L5 because of pain  shooting in that dermatomal pattern down the right leg  MRI bilateral knees to further evaluate meniscus and extent of degenerative changes  You have been ordered an MRI of the Right and Left Knees. Once you contact scheduling at [phone] and obtain the date and time of your MRI/MR Arthrogram, contact our office at [phone] to schedule your follow-up appointment to review your results. Please note the results of your imaging will not be discussed over the telephone.   Follow-up after MRI of both knees    Please note that this report has been produced using speech recognition software.  It may contain errors related to grammar, punctuation or spelling.  Electronically signed, but not reviewed.  ROSA MARIA Fregoso, Director of Sports Medicine    STEPHANIE AMARO on 2/13/25 at 5:11 PM.     TIKA Fregoso DO

## 2025-02-13 ENCOUNTER — HOSPITAL ENCOUNTER (OUTPATIENT)
Dept: RADIOLOGY | Facility: CLINIC | Age: 80
Discharge: HOME | End: 2025-02-13
Payer: MEDICARE

## 2025-02-13 ENCOUNTER — OFFICE VISIT (OUTPATIENT)
Dept: SPORTS MEDICINE | Facility: CLINIC | Age: 80
End: 2025-02-13
Payer: MEDICARE

## 2025-02-13 VITALS
BODY MASS INDEX: 23.55 KG/M2 | HEIGHT: 62 IN | SYSTOLIC BLOOD PRESSURE: 118 MMHG | HEART RATE: 72 BPM | WEIGHT: 128 LBS | DIASTOLIC BLOOD PRESSURE: 70 MMHG

## 2025-02-13 DIAGNOSIS — G89.29 CHRONIC PATELLOFEMORAL PAIN OF BOTH KNEES: ICD-10-CM

## 2025-02-13 DIAGNOSIS — M54.50 LUMBAR PAIN: ICD-10-CM

## 2025-02-13 DIAGNOSIS — M79.672 BILATERAL FOOT PAIN: ICD-10-CM

## 2025-02-13 DIAGNOSIS — M25.562 CHRONIC PATELLOFEMORAL PAIN OF BOTH KNEES: ICD-10-CM

## 2025-02-13 DIAGNOSIS — G89.29 BILATERAL CHRONIC KNEE PAIN: ICD-10-CM

## 2025-02-13 DIAGNOSIS — M25.78 DISC-OSTEOPHYTE COMPLEX: ICD-10-CM

## 2025-02-13 DIAGNOSIS — R51.9 ACHING HEADACHE: ICD-10-CM

## 2025-02-13 DIAGNOSIS — G25.81 RESTLESS LEG SYNDROME: ICD-10-CM

## 2025-02-13 DIAGNOSIS — M99.04 SOMATIC DYSFUNCTION OF SACRAL SPINE: ICD-10-CM

## 2025-02-13 DIAGNOSIS — S83.8X2A MENISCAL INJURY, LEFT, INITIAL ENCOUNTER: ICD-10-CM

## 2025-02-13 DIAGNOSIS — M25.562 BILATERAL CHRONIC KNEE PAIN: ICD-10-CM

## 2025-02-13 DIAGNOSIS — M24.559 HIP FLEXOR TIGHTNESS, UNSPECIFIED LATERALITY: ICD-10-CM

## 2025-02-13 DIAGNOSIS — M48.07 NEURAL FORAMINAL STENOSIS OF LUMBOSACRAL SPINE: ICD-10-CM

## 2025-02-13 DIAGNOSIS — M17.12 PRIMARY OSTEOARTHRITIS OF LEFT KNEE: ICD-10-CM

## 2025-02-13 DIAGNOSIS — M47.27 LUMBOSACRAL RADICULOPATHY DUE TO DEGENERATIVE JOINT DISEASE OF SPINE: Primary | ICD-10-CM

## 2025-02-13 DIAGNOSIS — M17.0 ARTHRITIS OF BOTH KNEES: ICD-10-CM

## 2025-02-13 DIAGNOSIS — M23.204 DEGENERATIVE TEAR OF MEDIAL MENISCUS OF LEFT KNEE: ICD-10-CM

## 2025-02-13 DIAGNOSIS — N28.1 RENAL CYSTS, ACQUIRED, BILATERAL: ICD-10-CM

## 2025-02-13 DIAGNOSIS — M46.46 DISCITIS OF LUMBAR REGION: ICD-10-CM

## 2025-02-13 DIAGNOSIS — M62.89 HAMSTRING TIGHTNESS: ICD-10-CM

## 2025-02-13 DIAGNOSIS — S39.012D LUMBAR SPINE STRAIN, SUBSEQUENT ENCOUNTER: ICD-10-CM

## 2025-02-13 DIAGNOSIS — S83.8X1A MENISCAL INJURY, RIGHT, INITIAL ENCOUNTER: ICD-10-CM

## 2025-02-13 DIAGNOSIS — G47.62 NOCTURNAL LEG CRAMPS: ICD-10-CM

## 2025-02-13 DIAGNOSIS — M17.11 PRIMARY OSTEOARTHRITIS OF RIGHT KNEE: ICD-10-CM

## 2025-02-13 DIAGNOSIS — M50.30 DISC-OSTEOPHYTE COMPLEX: ICD-10-CM

## 2025-02-13 DIAGNOSIS — R20.0 NUMBNESS IN BOTH LEGS: ICD-10-CM

## 2025-02-13 DIAGNOSIS — M79.662 PAIN IN BOTH LOWER LEGS: ICD-10-CM

## 2025-02-13 DIAGNOSIS — R25.1 OCCASIONAL TREMORS: ICD-10-CM

## 2025-02-13 DIAGNOSIS — M79.671 BILATERAL FOOT PAIN: ICD-10-CM

## 2025-02-13 DIAGNOSIS — M25.561 BILATERAL CHRONIC KNEE PAIN: ICD-10-CM

## 2025-02-13 DIAGNOSIS — M41.26 OTHER IDIOPATHIC SCOLIOSIS, LUMBAR REGION: ICD-10-CM

## 2025-02-13 DIAGNOSIS — G89.29 CHRONIC BILATERAL LOW BACK PAIN WITH RIGHT-SIDED SCIATICA: ICD-10-CM

## 2025-02-13 DIAGNOSIS — M51.26 PROTRUSION OF LUMBAR INTERVERTEBRAL DISC: ICD-10-CM

## 2025-02-13 DIAGNOSIS — M54.41 CHRONIC BILATERAL LOW BACK PAIN WITH RIGHT-SIDED SCIATICA: ICD-10-CM

## 2025-02-13 DIAGNOSIS — M21.079 ACQUIRED VALGUS DEFORMITY OF FOOT, UNSPECIFIED LATERALITY: ICD-10-CM

## 2025-02-13 DIAGNOSIS — M79.661 PAIN IN BOTH LOWER LEGS: ICD-10-CM

## 2025-02-13 DIAGNOSIS — M25.561 CHRONIC PATELLOFEMORAL PAIN OF BOTH KNEES: ICD-10-CM

## 2025-02-13 DIAGNOSIS — M89.8X6 BILATERAL TIBIAL PAIN: ICD-10-CM

## 2025-02-13 DIAGNOSIS — G57.63 MORTON'S NEUROMA OF BOTH FEET: ICD-10-CM

## 2025-02-13 DIAGNOSIS — M51.369 BULGING LUMBAR DISC: ICD-10-CM

## 2025-02-13 PROCEDURE — 1036F TOBACCO NON-USER: CPT | Performed by: FAMILY MEDICINE

## 2025-02-13 PROCEDURE — 1123F ACP DISCUSS/DSCN MKR DOCD: CPT | Performed by: FAMILY MEDICINE

## 2025-02-13 PROCEDURE — 1159F MED LIST DOCD IN RCRD: CPT | Performed by: FAMILY MEDICINE

## 2025-02-13 PROCEDURE — 99215 OFFICE O/P EST HI 40 MIN: CPT | Performed by: FAMILY MEDICINE

## 2025-02-13 PROCEDURE — 1125F AMNT PAIN NOTED PAIN PRSNT: CPT | Performed by: FAMILY MEDICINE

## 2025-02-13 PROCEDURE — 73564 X-RAY EXAM KNEE 4 OR MORE: CPT | Mod: 50

## 2025-02-13 ASSESSMENT — PATIENT HEALTH QUESTIONNAIRE - PHQ9
1. LITTLE INTEREST OR PLEASURE IN DOING THINGS: NOT AT ALL
SUM OF ALL RESPONSES TO PHQ9 QUESTIONS 1 AND 2: 0
2. FEELING DOWN, DEPRESSED OR HOPELESS: NOT AT ALL

## 2025-02-13 ASSESSMENT — PAIN SCALES - GENERAL: PAINLEVEL_OUTOF10: 4

## 2025-02-13 ASSESSMENT — COLUMBIA-SUICIDE SEVERITY RATING SCALE - C-SSRS
2. HAVE YOU ACTUALLY HAD ANY THOUGHTS OF KILLING YOURSELF?: NO
6. HAVE YOU EVER DONE ANYTHING, STARTED TO DO ANYTHING, OR PREPARED TO DO ANYTHING TO END YOUR LIFE?: NO
1. IN THE PAST MONTH, HAVE YOU WISHED YOU WERE DEAD OR WISHED YOU COULD GO TO SLEEP AND NOT WAKE UP?: NO

## 2025-02-13 ASSESSMENT — PAIN - FUNCTIONAL ASSESSMENT: PAIN_FUNCTIONAL_ASSESSMENT: 0-10

## 2025-02-13 ASSESSMENT — ENCOUNTER SYMPTOMS
DEPRESSION: 0
LOSS OF SENSATION IN FEET: 0
OCCASIONAL FEELINGS OF UNSTEADINESS: 0

## 2025-02-13 ASSESSMENT — PAIN DESCRIPTION - DESCRIPTORS: DESCRIPTORS: ACHING;SHOOTING;SORE;THROBBING

## 2025-02-13 NOTE — PATIENT INSTRUCTIONS
May continue to alternate ice and moist heat as needed  ,   Start into physical therapy 1-2 times a week for 10-12 weeks with manual therapy, dry needling, IASTM, and traction , as well as balance REQUEST ALBERTO LYLE  Stressed the importance of wearing shoes with good stability control to help with the biomechanics affecting the spine  Stressed the importance of wearing full foot insoles to help with the biomechanics affecting the spine and to provide cushioning  Recommendation over-the-counter vitamin-D 2 -3000+ milligrams a day, as well as a daily multivitamin.  Recommendation over-the-counter curcumin, turmeric, boswellia, as well as egg shell membrane as directed to aid with joint inflammation.  Recommendation over-the-counter Move Free for joint health.  May take OTC Tylenol Extra Strength or OTC Tylenol Arthritis, taking one every 6-8 hours with food as needed for pain management,   advised regarding the risks and/or potential adverse reactions and/or side effects of any prescribed medications along with any over-the-counter medications or any supplements used. Patient advised to seek immediate medical care if any adverse reactions occur. The patient and/or patient(s) parent(s) verbalized their understanding  At the patient's next office visit, will provide appropriate 9.0 millimeter heel lift to be placed in the left shoe to accommodate for leg length discrepancy found on standing erect pelvis xray  Possibility regenerative injections in the future  Continue follow-ups with Dr. Ballard regularly for her feet and her legs  Referral to physical medicine and rehab Timothy Alfonso for essential tremors and bilateral tibia pain  You have been ordered an MRI of the Right and Left Knees. Once you contact scheduling at [phone] and obtain the date and time of your MRI/MR Arthrogram, contact our office at [phone] to schedule your follow-up appointment to review your results. Please note the results of your  imaging will not be discussed over the telephone.   Follow-up after MRI of both knees

## 2025-03-03 ENCOUNTER — HOSPITAL ENCOUNTER (OUTPATIENT)
Dept: RADIOLOGY | Facility: CLINIC | Age: 80
Discharge: HOME | End: 2025-03-03
Payer: MEDICARE

## 2025-03-03 DIAGNOSIS — N28.1 CYST OF KIDNEY, ACQUIRED: ICD-10-CM

## 2025-03-03 PROCEDURE — 76770 US EXAM ABDO BACK WALL COMP: CPT | Performed by: STUDENT IN AN ORGANIZED HEALTH CARE EDUCATION/TRAINING PROGRAM

## 2025-03-03 PROCEDURE — 76770 US EXAM ABDO BACK WALL COMP: CPT

## 2025-03-05 ENCOUNTER — APPOINTMENT (OUTPATIENT)
Dept: RADIOLOGY | Facility: CLINIC | Age: 80
End: 2025-03-05
Payer: MEDICARE

## 2025-03-07 ENCOUNTER — EVALUATION (OUTPATIENT)
Dept: PHYSICAL THERAPY | Facility: CLINIC | Age: 80
End: 2025-03-07
Payer: MEDICARE

## 2025-03-07 DIAGNOSIS — G25.81 RESTLESS LEG SYNDROME: ICD-10-CM

## 2025-03-07 DIAGNOSIS — M54.41 CHRONIC BILATERAL LOW BACK PAIN WITH RIGHT-SIDED SCIATICA: ICD-10-CM

## 2025-03-07 DIAGNOSIS — G89.29 CHRONIC BILATERAL LOW BACK PAIN WITH RIGHT-SIDED SCIATICA: ICD-10-CM

## 2025-03-07 DIAGNOSIS — M21.70 LEG LENGTH DIFFERENCE, ACQUIRED: ICD-10-CM

## 2025-03-07 DIAGNOSIS — M79.672 BILATERAL FOOT PAIN: ICD-10-CM

## 2025-03-07 DIAGNOSIS — M79.662 PAIN IN BOTH LOWER LEGS: ICD-10-CM

## 2025-03-07 DIAGNOSIS — M54.50 LUMBAR PAIN: ICD-10-CM

## 2025-03-07 DIAGNOSIS — Z81.8 FAMILY HISTORY OF DEMENTIA: ICD-10-CM

## 2025-03-07 DIAGNOSIS — M79.671 BILATERAL FOOT PAIN: ICD-10-CM

## 2025-03-07 DIAGNOSIS — S39.012A LUMBAR STRAIN, INITIAL ENCOUNTER: ICD-10-CM

## 2025-03-07 DIAGNOSIS — R51.9 ACHING HEADACHE: ICD-10-CM

## 2025-03-07 DIAGNOSIS — M89.8X6 BILATERAL TIBIAL PAIN: ICD-10-CM

## 2025-03-07 DIAGNOSIS — R25.1 OCCASIONAL TREMORS: ICD-10-CM

## 2025-03-07 DIAGNOSIS — M46.46 DISCITIS OF LUMBAR REGION: ICD-10-CM

## 2025-03-07 DIAGNOSIS — R20.0 NUMBNESS IN BOTH LEGS: ICD-10-CM

## 2025-03-07 DIAGNOSIS — Z82.0 FAMILY HISTORY OF PARKINSONISM: ICD-10-CM

## 2025-03-07 DIAGNOSIS — M79.661 PAIN IN BOTH LOWER LEGS: ICD-10-CM

## 2025-03-07 PROCEDURE — 97161 PT EVAL LOW COMPLEX 20 MIN: CPT | Mod: GP

## 2025-03-07 ASSESSMENT — PAIN SCALES - GENERAL: PAINLEVEL_OUTOF10: 0 - NO PAIN

## 2025-03-07 ASSESSMENT — PAIN - FUNCTIONAL ASSESSMENT: PAIN_FUNCTIONAL_ASSESSMENT: 0-10

## 2025-03-08 PROBLEM — S39.012A LUMBAR STRAIN, INITIAL ENCOUNTER: Status: ACTIVE | Noted: 2025-02-13

## 2025-03-08 NOTE — PROGRESS NOTES
"    Physical Therapy Evaluation    Patient Name: Carmita Araiza  MRN: [id number]  Evaluation Date: 3/7/2025  Time Calculation  Start Time: 1430  Stop Time: 1500  Time Calculation (min): 30 min  PT Evaluation Time Entry  PT Evaluation (Low) Time Entry: 30             Problem List Items Addressed This Visit             ICD-10-CM    Bilateral tibial pain M89.8X6    Bilateral foot pain M79.671, M79.672    Leg length difference, acquired M21.70    Chronic bilateral low back pain with right-sided sciatica M54.41, G89.29    Discitis of lumbar region M46.46    Lumbar pain M54.50    Aching headache R51.9    Family history of dementia Z81.8    Family history of parkinsonism Z82.0    Occasional tremors R25.1    Pain in both lower legs M79.661, M79.662    Restless leg syndrome G25.81    Numbness in both legs R20.0    Lumbar strain, initial encounter S39.012A         Subjective   General:  General  Reason for Referral: Pain/paraesthesias in LE's, RLS  Referred By: Dr. Aram Sena DO  Past Medical History Relevant to Rehab: 78 y/o F who presents for PT evaluation with cc of LE pain/paraesthesias with RLS symptoms \"only at night\".  Denies additional hx.  Reports recent work up/MRI of brain which was negative.  Reports \"disc out\" in lower  back.  Denies recent falls.  Accompanied by .  Patient appears anxious.  Notable tremor L hand and B/L LE's in standing.  General Comment: Patient reports she believes she does NOT need physical therapy.  Hesitant because she is not sure it can help.  Educated in benefits of PT in regards to treating lower back.  Questionable carry over.  Agreeable to evaluation but will wait to consider commencing follow up treatment.  Scheduled for injection next week with ellis Perezisonally in to lower back.  Wants to complete that first.    Patient reported hx of condition: RLS    Precautions:   LE pain considerations, requires reassurance due to apprehension of tx    Relevant PMH:  RLS, work up " for back pain/LE pain    Red flags: None    Pain:  Pain Assessment: 0-10  0-10 (Numeric) Pain Score: 0 - No pain  Pain Type:  (No current pain.  4-5/10 at night)  Home Living:  Home Living Comment: Independent    Prior Function Per Pt/Caregiver Report:  Ambulatory Assistance: Independent  Prior Function Comments: Dancer    Imagin. Multilevel degenerative discogenic changes and disc bulge as  described above without narrowing of the thecal sac.      2. Right paracentral and foraminal disc protrusion T12/L1 with mild  right foraminal narrowing      3. Left paracentral and foraminal disc protrusion L3/4 contributing  to moderate left foraminal narrowing    OBJECTIVE:  Objective   Posture:  Posture Comment: Lumbar WFL  Range of Motion:  Range of Motion Comments: Trunk rotation limited B/L.  LE WFL.  Strength:  Strength Comments: 3+/5 B/L LE.  Unstable bridge with lateral displacement trunk/pelvis  Flexibility:  Flexibility Comment: Hypermobile LE's  Palpation:  Palpation Comment: Non-tender  Special Tests:  Special Tests Comment: Negative SLR, SKC, DKC.  No back pain throughout.  + LOB tandem stance B/L.  Heel to shin exam intact.  Gait:  Gait Comment: Tremulous B/L LE's  Balance:  Balance Comment: Unable to sustain tandem stance/SLS due to tremor  Stairs:  Stairs Comment: N/A  Bed Mobility:  Bed Mobility Comment: IND  Transfers:  Transfers Comment: IND  Other:  Comment: Light touch intact B/L LE's but reports RLS symptoms at night only involved L4-L5 dermatomes/shins    Outcome Measures:  11% impairment LEFS    Assessment  PT Assessment Results: Decreased strength, Decreased endurance, Pain, Impaired sensation  Rehab Prognosis: Fair  Barriers to Discharge: Patient hesitant to trial PT  Evaluation/Treatment Tolerance: Patient tolerated treatment well    Pt is a 79 y.o. female who presents with impairments of B/L LE weakness and paraesthesias. These impairments have led to functional limitations including impaired  sleep quality with restless legs at night. Pt would benefit from skilled physical therapy intervention to improve above impairments and facilitate return to function.    Complexity of Evaluation: Low    Based on the history including personal factors and/or comorbidities, examination of body systems including body structures and function, activity limitations, and/or participation restrictions, as well as clinical presentation, patient meets criteria for above complexity evaluation.    Plan  Treatment/Interventions: Aquatic therapy, Blood flow restriction therapy, Cryotherapy, Dry needling, Education/ Instruction, Electrical stimulation, Gait training, Manual therapy, Mechanical traction, Neuromuscular re-education, Self care/ home management, Taping techniques, Therapeutic activities, Therapeutic exercises, Ultrasound  PT Plan: Skilled PT  PT Frequency: 2 times per week  Duration: 8-10 weeks  Certification Period Start Date: 03/07/25  Number of Treatments Authorized: daniela henry  Rehab Potential: Fair  Plan of Care Agreement: Patient (+ Spouse)    Insurance Plan: Payor: [employer] / Plan: [employer] ADVANTAGE / Product Type: *No Product type* /     Plan for next visit: LE strengthening, mechanical traction, review Laly extension    OP EDUCATION:  Outpatient Education  Individual(s) Educated: Patient, Spouse  Education Provided: POC  Risk and Benefits Discussed with Patient/Caregiver/Other: yes  Patient/Caregiver Demonstrated Understanding: yes  Plan of Care Discussed and Agreed Upon: yes  Patient Response to Education: Patient/Caregiver Verbalized Understanding of Information    Today's Treatment:  No treatment billed today as pt requires prior authorization  HEP to be completed daily, exercises include:  To be developed    Goals:  STG 1: Patient will be IND with Laly extension program for lumbar disc protrusion correction  LTG 1: Patient will report > 75% in RLS symptoms at night  LTG 2:  Patient will demonstrate 4/5 LE strength for improved standing endurance for recreational dance  LTG 3: Patient will report < 3/10 RLS pain at night for improved sleep quality  LTG 4: Patient will demonstrate no displacement with B/L mod tandem stance for improved balance during gait  LTG 5: Patient will demonstrate < 11% impairment LEFS

## 2025-04-11 PROBLEM — K30 FUNCTIONAL DYSPEPSIA: Status: ACTIVE | Noted: 2025-04-11

## 2025-04-24 ENCOUNTER — TREATMENT (OUTPATIENT)
Dept: PHYSICAL THERAPY | Facility: CLINIC | Age: 80
End: 2025-04-24
Payer: MEDICARE

## 2025-04-24 DIAGNOSIS — M79.661 PAIN IN BOTH LOWER LEGS: ICD-10-CM

## 2025-04-24 DIAGNOSIS — Z82.0 FAMILY HISTORY OF PARKINSONISM: ICD-10-CM

## 2025-04-24 DIAGNOSIS — M79.662 PAIN IN BOTH LOWER LEGS: ICD-10-CM

## 2025-04-24 DIAGNOSIS — R25.1 OCCASIONAL TREMORS: ICD-10-CM

## 2025-04-24 DIAGNOSIS — Z81.8 FAMILY HISTORY OF DEMENTIA: ICD-10-CM

## 2025-04-24 PROCEDURE — 97110 THERAPEUTIC EXERCISES: CPT | Mod: GP

## 2025-04-25 NOTE — PROGRESS NOTES
"    Physical Therapy Treatment    Patient Name: Carmita Araiza  MRN: [id number]  Encounter date:  4/24/2025                Visit Number:  1/7  Planned total visits: 7  Visits Authorized/Insurance Coverage:  7 visits until 5/19    Current Problem  Problem List Items Addressed This Visit           ICD-10-CM    Family history of dementia Z81.8    Family history of parkinsonism Z82.0    Occasional tremors R25.1    Pain in both lower legs M79.661, M79.662       Precautions  Highly anxious    Pain  Denies    Subjective  Had injections from Dr. Alfonso in lower back.  \"Helped some\".  Denies LBP or LE pain.  Still having trouble sleeping at night due to RLS symptoms.  Does reports \"things are a little better\".  Discussed treatment options.  At this time tx focus to be on core strengthening for lumbar stabilization to consider symptoms may be coming from lumbar spine, h/o disc bulge.  No need for pain control at this time.  Patient still unsure if she needs PT    Objective  Tremors noted all appendages and in trunk; increased with movement    Treatment:  Therapeutic exercise for core strengthening and lumbar stabilization  HL ISO ABD x 10  PPT x 10  Bridge x 10  LTR x 10  RSB DKC x 20  RSB LTR x 20    Total exercise time 15 minutes with education throughout    Current HEP:  Abdominal bracing  Walking as tolerated    Has patient been compliant with HEP? Yes    Activity tolerance:  Good    OP EDUCATION:  Reviewed tx options    Assessment: Tolerates all exercise no pain but increased tremor.  Denies change in RLS symptoms but usually only active at night, not day or with exercise.  Denies major balance impairment but does state balance impairment increased with anxiety.  Tx focus on core strengthening today.  Limited session due to conversation and re-assurance provided of benefits and need of PT.  Patient to trial 1-2 more visits PT then to consider discharge as she if not sure if it will help.  Will benefit from continued PT for " core strengthening.     Pain end of session: 0    Plan:     Continue with current POC/no changes    Assessment of current progress against goals:  Insufficient treatment time to assess progress    Goals:   See eval

## 2025-05-01 ENCOUNTER — TREATMENT (OUTPATIENT)
Dept: PHYSICAL THERAPY | Facility: CLINIC | Age: 80
End: 2025-05-01
Payer: MEDICARE

## 2025-05-01 DIAGNOSIS — R25.1 OCCASIONAL TREMORS: ICD-10-CM

## 2025-05-01 DIAGNOSIS — Z81.8 FAMILY HISTORY OF DEMENTIA: ICD-10-CM

## 2025-05-01 DIAGNOSIS — M79.661 PAIN IN BOTH LOWER LEGS: ICD-10-CM

## 2025-05-01 DIAGNOSIS — Z82.0 FAMILY HISTORY OF PARKINSONISM: ICD-10-CM

## 2025-05-01 DIAGNOSIS — M79.662 PAIN IN BOTH LOWER LEGS: ICD-10-CM

## 2025-05-06 ENCOUNTER — OFFICE VISIT (OUTPATIENT)
Dept: PRIMARY CARE | Facility: CLINIC | Age: 80
End: 2025-05-06
Payer: MEDICARE

## 2025-05-06 VITALS
WEIGHT: 131 LBS | HEART RATE: 90 BPM | DIASTOLIC BLOOD PRESSURE: 98 MMHG | BODY MASS INDEX: 24.11 KG/M2 | SYSTOLIC BLOOD PRESSURE: 153 MMHG | OXYGEN SATURATION: 95 % | HEIGHT: 62 IN

## 2025-05-06 DIAGNOSIS — J30.9 ALLERGIC RHINITIS, UNSPECIFIED SEASONALITY, UNSPECIFIED TRIGGER: ICD-10-CM

## 2025-05-06 DIAGNOSIS — R42 DIZZINESS: ICD-10-CM

## 2025-05-06 DIAGNOSIS — I67.1 BRAIN ANEURYSM (HHS-HCC): Primary | ICD-10-CM

## 2025-05-06 PROCEDURE — 99213 OFFICE O/P EST LOW 20 MIN: CPT | Performed by: FAMILY MEDICINE

## 2025-05-06 PROCEDURE — 1159F MED LIST DOCD IN RCRD: CPT | Performed by: FAMILY MEDICINE

## 2025-05-06 PROCEDURE — G2211 COMPLEX E/M VISIT ADD ON: HCPCS | Performed by: FAMILY MEDICINE

## 2025-05-06 PROCEDURE — G8433 SCR FOR DEP NOT CPT DOC RSN: HCPCS | Performed by: FAMILY MEDICINE

## 2025-05-06 ASSESSMENT — ENCOUNTER SYMPTOMS
RHINORRHEA: 1
DIZZINESS: 1
PHOTOPHOBIA: 1

## 2025-05-06 ASSESSMENT — COLUMBIA-SUICIDE SEVERITY RATING SCALE - C-SSRS
1. IN THE PAST MONTH, HAVE YOU WISHED YOU WERE DEAD OR WISHED YOU COULD GO TO SLEEP AND NOT WAKE UP?: NO
2. HAVE YOU ACTUALLY HAD ANY THOUGHTS OF KILLING YOURSELF?: NO
6. HAVE YOU EVER DONE ANYTHING, STARTED TO DO ANYTHING, OR PREPARED TO DO ANYTHING TO END YOUR LIFE?: NO

## 2025-05-06 NOTE — PROGRESS NOTES
"Subjective   Patient ID: Carmita Araiza is a 79 y.o. female who presents for Brain Aneurysm (Pt had small brain aneurysm at [facility] on 1-31-25 they sent her home she needs referral neurosurgery/Pt still gets dizziness when laying down and pressure on eyes ).    1) Brain Aneurysm: seen at [facility] ER on 1/31/2025 for right eyelid ptosis and right sided weakness, diagnosed with right sided weakness, history of ptosis of eyelid, and cerebral aneurysm, was offered a neurosurgery referral but the patient declined stating she would obtain a referral from PCP, MRI of the brain in February was unremarkable, patient says she had a carotid US completed which I could not find, patient still gets dizzy for seconds when she changes head position, feels pressure on her eyes, bright light bothers her, and has brain fog, patient would like to see Dr. Lazo (NS at [facility]).    2) Allergy symptoms: patient complains of nasal congestion and clear rhinorrhea, thinks ears are affected.    Review of Systems   HENT:  Positive for congestion (nasal) and rhinorrhea.    Eyes:  Positive for photophobia.        Positive for eye pressure.   Neurological:  Positive for dizziness.        Positive for brain fog     Objective   BP (!) 153/98 (BP Location: Right arm, Patient Position: Sitting, BP Cuff Size: Adult)   Pulse 90   Ht 1.575 m (5' 2\")   Wt 59.4 kg (131 lb)   SpO2 95%   BMI 23.96 kg/m²     Physical Exam  Vitals and nursing note reviewed.   Constitutional:       General: She is not in acute distress.     Appearance: Normal appearance. She is normal weight. She is not ill-appearing.      Comments: Accompanied by .   HENT:      Right Ear: Hearing, ear canal and external ear normal. A middle ear effusion is present. Tympanic membrane is not erythematous, retracted or bulging.      Left Ear: Hearing, ear canal and external ear normal. A middle ear effusion is present. Tympanic membrane is not erythematous, retracted or " bulging.      Ears:      Comments: More mid ear effusion on the right.     Nose: Mucosal edema (very mild erythema) present. No rhinorrhea.      Right Turbinates: Swollen.      Left Turbinates: Swollen.   Cardiovascular:      Rate and Rhythm: Normal rate and regular rhythm.      Heart sounds: Normal heart sounds.   Pulmonary:      Effort: Pulmonary effort is normal.      Breath sounds: Normal breath sounds.   Neurological:      Mental Status: She is alert.   Psychiatric:         Behavior: Behavior normal.      Comments: Appears anxious.     Assessment/Plan   Diagnoses and all orders for this visit:  Brain aneurysm ([facility])  New  Referral to Neurosurgery  Await input.  Follow up as directed.    Dizziness  New, unresolved  Referral to Neurosurgery  Await input.  May see Dr. JOÃO Lacey (saw Dr. Thornton but did not like him).  Follow up as directed.   Allergic rhinitis, unspecified seasonality, unspecified trigger  Recurrence  Flonase recommended.

## 2025-06-16 ENCOUNTER — APPOINTMENT (OUTPATIENT)
Dept: DERMATOLOGY | Facility: CLINIC | Age: 80
End: 2025-06-16
Payer: MEDICARE

## 2025-06-16 DIAGNOSIS — D18.01 ANGIOMA OF SKIN: Primary | ICD-10-CM

## 2025-06-16 DIAGNOSIS — L82.1 SEBORRHEIC KERATOSIS: ICD-10-CM

## 2025-06-16 DIAGNOSIS — L90.5 SCAR CONDITIONS AND FIBROSIS OF SKIN: ICD-10-CM

## 2025-06-16 DIAGNOSIS — L81.4 LENTIGO: ICD-10-CM

## 2025-06-16 DIAGNOSIS — D22.9 BENIGN NEVUS: ICD-10-CM

## 2025-06-16 DIAGNOSIS — L57.9 SKIN CHANGES DUE TO CHRONIC EXPOSURE TO NONIONIZING RADIATION: ICD-10-CM

## 2025-06-16 PROCEDURE — 1160F RVW MEDS BY RX/DR IN RCRD: CPT | Performed by: NURSE PRACTITIONER

## 2025-06-16 PROCEDURE — 1159F MED LIST DOCD IN RCRD: CPT | Performed by: NURSE PRACTITIONER

## 2025-06-16 PROCEDURE — 1036F TOBACCO NON-USER: CPT | Performed by: NURSE PRACTITIONER

## 2025-06-16 PROCEDURE — 99213 OFFICE O/P EST LOW 20 MIN: CPT | Performed by: NURSE PRACTITIONER

## 2025-06-16 ASSESSMENT — DERMATOLOGY PATIENT ASSESSMENT
ARE YOU TRYING TO GET PREGNANT: NO
HAVE YOU HAD OR DO YOU HAVE A STAPH INFECTION: NO
DO YOU HAVE IRREGULAR MENSTRUAL CYCLES: NO
ARE YOU AN ORGAN TRANSPLANT RECIPIENT: NO
HAVE YOU HAD OR DO YOU HAVE VASCULAR DISEASE: NO
ARE YOU ON BIRTH CONTROL: NO
DO YOU USE A TANNING BED: NO
DO YOU HAVE ANY NEW OR CHANGING LESIONS: NO

## 2025-06-16 ASSESSMENT — DERMATOLOGY QUALITY OF LIFE (QOL) ASSESSMENT
ARE THERE EXCLUSIONS OR EXCEPTIONS FOR THE QUALITY OF LIFE ASSESSMENT: NO
DATE THE QUALITY-OF-LIFE ASSESSMENT WAS COMPLETED: 67372
WHAT SINGLE SKIN CONDITION LISTED BELOW IS THE PATIENT ANSWERING THE QUALITY-OF-LIFE ASSESSMENT QUESTIONS ABOUT: NONE OF THE ABOVE
RATE HOW EMOTIONALLY BOTHERED YOU ARE BY YOUR SKIN PROBLEM (FOR EXAMPLE, WORRY, EMBARRASSMENT, FRUSTRATION): 0 - NEVER BOTHERED
RATE HOW BOTHERED YOU ARE BY SYMPTOMS OF YOUR SKIN PROBLEM (EG, ITCHING, STINGING BURNING, HURTING OR SKIN IRRITATION): 0 - NEVER BOTHERED
RATE HOW BOTHERED YOU ARE BY EFFECTS OF YOUR SKIN PROBLEMS ON YOUR ACTIVITIES (EG, GOING OUT, ACCOMPLISHING WHAT YOU WANT, WORK ACTIVITIES OR YOUR RELATIONSHIPS WITH OTHERS): 0 - NEVER BOTHERED

## 2025-06-16 ASSESSMENT — ITCH NUMERIC RATING SCALE: HOW SEVERE IS YOUR ITCHING?: 0

## 2025-06-16 ASSESSMENT — PATIENT GLOBAL ASSESSMENT (PGA): PATIENT GLOBAL ASSESSMENT: PATIENT GLOBAL ASSESSMENT:  1 - CLEAR

## 2025-06-16 NOTE — Clinical Note
CONST: no fevers, no chills  EYES: no pain, no vision changes  ENT: no sore throat, no ear pain, no change in hearing  CV: no chest pain, no leg swelling  RESP: no shortness of breath, no cough  ABD: no abdominal pain, no nausea, no vomiting, no diarrhea  : no dysuria, no flank pain, no hematuria  MSK: no back pain, +L foot pain  NEURO: no headache or additional neurologic complaints  HEME: no easy bleeding  SKIN:  no rash Seborrheic keratoses are common noncancerous (benign) growths of unknown cause seen in adults due to a thickening of an area of the top skin layer. Seborrheic keratoses may appear as if they are stuck on to the skin. They have distinct borders, and they may appear as papules (small, solid bumps) or plaques (solid, raised patches that are bigger than a thumbnail). They may be the same color as your skin, or they may be pink, light brown, darker brown, or very dark brown, or sometimes may appear black.    There is no way to prevent new seborrheic keratoses from forming. Seborrheic keratoses can be removed, but removal is considered a cosmetic issue and is usually not covered by insurance.    PLAN  No treatment is needed unless there is irritation from clothing, such as itching or bleeding.  2.   Some lotions containing alpha hydroxy acids, salicylic acid, or urea may make the areas feel smoother with regular use but will not eliminate them.

## 2025-06-16 NOTE — PROGRESS NOTES
Subjective     Carmita Araiza is a 80 y.o. female who presents for the following: Skin Check.     Review of Systems:  No other skin or systemic complaints other than what is documented elsewhere in the note.    The following portions of the chart were reviewed this encounter and updated as appropriate:   Tobacco  Allergies  Meds  Problems  Med Hx  Surg Hx         Skin Cancer History  Biopsy Log Book  No skin cancers from Specimen Tracking.    Additional History      Specialty Problems    None       Objective   Well appearing patient in no apparent distress; mood and affect are within normal limits.    A full examination was performed including scalp, head, eyes, ears, nose, lips, neck, chest, axillae, abdomen, back, buttocks, bilateral upper extremities, bilateral lower extremities, hands, feet, fingers, toes, fingernails, and toenails. All findings within normal limits unless otherwise noted below.      Assessment/Plan   Skin Exam  1. ANGIOMA OF SKIN  Generalized  Scattered cherry-red papule(s).  A cherry hemangioma is a small macule (small, flat, smooth area) or papule (small, solid bump) formed from an overgrowth of tiny blood vessels in the skin. Cherry hemangiomas are characteristically red or purplish in color. They often first appear in middle adulthood and usually increase in number with age. Cherry hemangiomas are noncancerous (benign) and are common in adults.    The present appearance of the lesion is not worrisome but it should continue to be observed and testing/treatment may be warranted if change occurs.  Related Procedures  Follow Up In Dermatology - Established Patient  2. BENIGN NEVUS (2)  Generalized, Left Lower Leg - Posterior  Scattered, uniform and benign-appearing, regular brown melanocytic papules and macules.    Left mid lateral thigh area has a 3 x 2mm blue papule - stable compared to [facility].   Left medial mid calf area has a 3 x 2 mm dark brown macule with reticular  pattern and some globules semi symmetrically.   The present appearance of the lesion is not worrisome but it should continue to be observed and testing/treatment may be warranted if change occurs.    Left thigh lesion stable. Will continue to monitor. Advised to monitor left medial mid calf lesion, 1 SD from other nevi on the legs with some globules noted. She will monitor and if noticing change in shape size or color she will notify me.   Related Procedures  Follow Up In Dermatology - Established Patient  3. SEBORRHEIC KERATOSIS  Generalized  Stuck on verrucous, tan-brown papules and plaques.    Seborrheic keratoses are common noncancerous (benign) growths of unknown cause seen in adults due to a thickening of an area of the top skin layer. Seborrheic keratoses may appear as if they are stuck on to the skin. They have distinct borders, and they may appear as papules (small, solid bumps) or plaques (solid, raised patches that are bigger than a thumbnail). They may be the same color as your skin, or they may be pink, light brown, darker brown, or very dark brown, or sometimes may appear black.    There is no way to prevent new seborrheic keratoses from forming. Seborrheic keratoses can be removed, but removal is considered a cosmetic issue and is usually not covered by insurance.    PLAN  No treatment is needed unless there is irritation from clothing, such as itching or bleeding.  2.   Some lotions containing alpha hydroxy acids, salicylic acid, or urea may make the areas feel smoother with regular use but will not eliminate them.  Related Procedures  Follow Up In Dermatology - Established Patient  4. LENTIGO  Generalized  Scattered tan macules in sun-exposed areas.  A solar lentigo (plural, solar lentigines), also known as a sun-induced freckle or senile lentigo, is a dark (hyperpigmented) lesion caused by natural or artificial ultraviolet (UV) light. Solar lentigines may be single or multiple. This type of lentigo  is different from a simple lentigo (lentigo simplex) because it is caused by exposure to UV light. Solar lentigines are benign, but they do indicate excessive sun exposure, a risk factor for the development of skin cancer.    To prevent solar lentigines, avoid exposure to sunlight in midday (10 AM to 3 PM), wear sun-protective clothing (tightly woven clothes and hats), and apply sunscreen (SPF 30 UVA and UVB block).    The present appearance of the lesion is not worrisome but it should continue to be observed and testing/treatment may be warranted if change occurs.  Related Procedures  Follow Up In Dermatology - Established Patient  5. SCAR CONDITIONS AND FIBROSIS OF SKIN  Generalized  Well healed scar at the site(s) of prior treatment with no evidence of recurrence.        The scar is clear, there is no evidence of recurrence.  The present appearance of the scar is not worrisome but it should continue to be observed and testing/treatment may be warranted if change occurs.    Related Procedures  Follow Up In Dermatology - Established Patient  6. SKIN CHANGES DUE TO CHRONIC EXPOSURE TO NONIONIZING RADIATION  Generalized  Actinic changes in the form of freckles, lentigines and hyper/hypopigmentation   ABCDEs of melanoma and atypical moles were discussed with the patient.    Patient was instructed to perform monthly self skin examination.  We recommended that the patient have regular full skin exams given an increased risk of subsequent skin cancers.    The patient was instructed to use sun protective behaviors including use of broad spectrum sunscreens and sun protective clothing to reduce risk of skin cancers.    Warning signs of non-melanoma skin cancer discussed.  Related Procedures  Follow Up In Dermatology - Established Patient    Return to clinic in 1 year for skin check/follow up or sooner if needed

## 2025-06-16 NOTE — Clinical Note
Scattered, uniform and benign-appearing, regular brown melanocytic papules and macules.    Left mid lateral thigh area has a 3 x 2mm blue papule - stable compared to [facility].   Left medial mid calf area has a 3 x 2 mm dark brown macule with reticular pattern and some globules semi symmetrically.

## 2025-06-16 NOTE — PATIENT INSTRUCTIONS

## 2025-06-16 NOTE — Clinical Note
The present appearance of the lesion is not worrisome but it should continue to be observed and testing/treatment may be warranted if change occurs.    Left thigh lesion stable. Will continue to monitor. Advised to monitor left medial mid calf lesion, 1 SD from other nevi on the legs with some globules noted. She will monitor and if noticing change in shape size or color she will notify me.

## 2025-09-18 ENCOUNTER — APPOINTMENT (OUTPATIENT)
Dept: PRIMARY CARE | Facility: CLINIC | Age: 80
End: 2025-09-18
Payer: MEDICARE